# Patient Record
Sex: FEMALE | Race: WHITE | NOT HISPANIC OR LATINO | Employment: OTHER | URBAN - NONMETROPOLITAN AREA
[De-identification: names, ages, dates, MRNs, and addresses within clinical notes are randomized per-mention and may not be internally consistent; named-entity substitution may affect disease eponyms.]

---

## 2023-02-28 PROBLEM — R74.8 ELEVATED ALKALINE PHOSPHATASE MEASUREMENT: Status: ACTIVE | Noted: 2023-02-28

## 2023-02-28 PROBLEM — R06.02 MILD SHORTNESS OF BREATH: Status: ACTIVE | Noted: 2023-02-28

## 2023-02-28 PROBLEM — M25.571 ACUTE RIGHT ANKLE PAIN: Status: ACTIVE | Noted: 2023-02-28

## 2023-02-28 PROBLEM — S89.91XA RIGHT KNEE INJURY: Status: ACTIVE | Noted: 2023-02-28

## 2023-02-28 PROBLEM — F32.A DEPRESSION: Status: ACTIVE | Noted: 2023-02-28

## 2023-02-28 PROBLEM — S86.819A STRAIN OF CALF MUSCLE: Status: ACTIVE | Noted: 2023-02-28

## 2023-02-28 PROBLEM — R00.2 PALPITATIONS: Status: ACTIVE | Noted: 2023-02-28

## 2023-02-28 PROBLEM — E66.01 CLASS 3 SEVERE OBESITY DUE TO EXCESS CALORIES WITH BODY MASS INDEX (BMI) OF 50.0 TO 59.9 IN ADULT (MULTI): Status: ACTIVE | Noted: 2023-02-28

## 2023-02-28 PROBLEM — Q21.12 PFO (PATENT FORAMEN OVALE) (HHS-HCC): Status: ACTIVE | Noted: 2023-02-28

## 2023-02-28 PROBLEM — E55.9 VITAMIN D DEFICIENCY: Status: ACTIVE | Noted: 2023-02-28

## 2023-02-28 PROBLEM — E66.813 CLASS 3 SEVERE OBESITY DUE TO EXCESS CALORIES WITH BODY MASS INDEX (BMI) OF 50.0 TO 59.9 IN ADULT: Status: ACTIVE | Noted: 2023-02-28

## 2023-02-28 PROBLEM — F41.9 ANXIETY: Status: ACTIVE | Noted: 2023-02-28

## 2023-02-28 PROBLEM — R79.89 ABNORMAL CBC MEASUREMENT: Status: ACTIVE | Noted: 2023-02-28

## 2023-02-28 LAB
ALANINE AMINOTRANSFERASE (SGPT) (U/L) IN SER/PLAS: 28 U/L (ref 7–45)
ALBUMIN (G/DL) IN SER/PLAS: 4.2 G/DL (ref 3.4–5)
ALKALINE PHOSPHATASE (U/L) IN SER/PLAS: 111 U/L (ref 33–110)
ASPARTATE AMINOTRANSFERASE (SGOT) (U/L) IN SER/PLAS: 17 U/L (ref 9–39)
BASOPHILS (10*3/UL) IN BLOOD BY AUTOMATED COUNT: 0.04 X10E9/L (ref 0–0.1)
BASOPHILS/100 LEUKOCYTES IN BLOOD BY AUTOMATED COUNT: 0.4 % (ref 0–2)
BILIRUBIN DIRECT (MG/DL) IN SER/PLAS: 0 MG/DL (ref 0–0.3)
BILIRUBIN TOTAL (MG/DL) IN SER/PLAS: 0.3 MG/DL (ref 0–1.2)
EOSINOPHILS (10*3/UL) IN BLOOD BY AUTOMATED COUNT: 0.37 X10E9/L (ref 0–0.7)
EOSINOPHILS/100 LEUKOCYTES IN BLOOD BY AUTOMATED COUNT: 3.3 % (ref 0–6)
ERYTHROCYTE DISTRIBUTION WIDTH (RATIO) BY AUTOMATED COUNT: 13.3 % (ref 11.5–14.5)
ERYTHROCYTE MEAN CORPUSCULAR HEMOGLOBIN CONCENTRATION (G/DL) BY AUTOMATED: 31.5 G/DL (ref 32–36)
ERYTHROCYTE MEAN CORPUSCULAR VOLUME (FL) BY AUTOMATED COUNT: 84 FL (ref 80–100)
ERYTHROCYTES (10*6/UL) IN BLOOD BY AUTOMATED COUNT: 4.75 X10E12/L (ref 4–5.2)
FERRITIN (UG/LL) IN SER/PLAS: 87 UG/L (ref 8–150)
GAMMA GLUTAMYL TRANSFERASE (U/L) IN SER/PLAS: 19 U/L (ref 5–55)
HEMATOCRIT (%) IN BLOOD BY AUTOMATED COUNT: 39.7 % (ref 36–46)
HEMOGLOBIN (G/DL) IN BLOOD: 12.5 G/DL (ref 12–16)
IMMATURE GRANULOCYTES/100 LEUKOCYTES IN BLOOD BY AUTOMATED COUNT: 0.3 % (ref 0–0.9)
LEUKOCYTES (10*3/UL) IN BLOOD BY AUTOMATED COUNT: 11.2 X10E9/L (ref 4.4–11.3)
LYMPHOCYTES (10*3/UL) IN BLOOD BY AUTOMATED COUNT: 3.08 X10E9/L (ref 1.2–4.8)
LYMPHOCYTES/100 LEUKOCYTES IN BLOOD BY AUTOMATED COUNT: 27.5 % (ref 13–44)
MONOCYTES (10*3/UL) IN BLOOD BY AUTOMATED COUNT: 0.44 X10E9/L (ref 0.1–1)
MONOCYTES/100 LEUKOCYTES IN BLOOD BY AUTOMATED COUNT: 3.9 % (ref 2–10)
NEUTROPHILS (10*3/UL) IN BLOOD BY AUTOMATED COUNT: 7.26 X10E9/L (ref 1.2–7.7)
NEUTROPHILS/100 LEUKOCYTES IN BLOOD BY AUTOMATED COUNT: 64.6 % (ref 40–80)
PLATELETS (10*3/UL) IN BLOOD AUTOMATED COUNT: 481 X10E9/L (ref 150–450)
PROTEIN TOTAL: 8 G/DL (ref 6.4–8.2)

## 2023-02-28 RX ORDER — UBROGEPANT 50 MG/1
TABLET ORAL
COMMUNITY
Start: 2022-03-08 | End: 2023-06-13 | Stop reason: ALTCHOICE

## 2023-02-28 RX ORDER — ESCITALOPRAM OXALATE 5 MG/1
1 TABLET ORAL DAILY
COMMUNITY
End: 2023-03-14 | Stop reason: SDUPTHER

## 2023-02-28 RX ORDER — ERENUMAB-AOOE 70 MG/ML
INJECTION SUBCUTANEOUS
COMMUNITY
Start: 2022-05-05

## 2023-02-28 RX ORDER — LACOSAMIDE 100 MG/1
1 TABLET ORAL 2 TIMES DAILY
COMMUNITY
Start: 2022-02-02

## 2023-02-28 RX ORDER — LEVETIRACETAM 1000 MG/1
TABLET ORAL
COMMUNITY
Start: 2021-06-28 | End: 2023-06-13 | Stop reason: ALTCHOICE

## 2023-03-14 ENCOUNTER — OFFICE VISIT (OUTPATIENT)
Dept: PRIMARY CARE | Facility: CLINIC | Age: 28
End: 2023-03-14
Payer: MEDICAID

## 2023-03-14 VITALS
DIASTOLIC BLOOD PRESSURE: 78 MMHG | BODY MASS INDEX: 50.02 KG/M2 | WEIGHT: 293 LBS | HEIGHT: 64 IN | SYSTOLIC BLOOD PRESSURE: 120 MMHG | OXYGEN SATURATION: 98 % | HEART RATE: 78 BPM

## 2023-03-14 DIAGNOSIS — F41.9 ANXIETY: Primary | ICD-10-CM

## 2023-03-14 DIAGNOSIS — E66.01 CLASS 3 SEVERE OBESITY DUE TO EXCESS CALORIES WITHOUT SERIOUS COMORBIDITY WITH BODY MASS INDEX (BMI) OF 50.0 TO 59.9 IN ADULT (MULTI): ICD-10-CM

## 2023-03-14 PROBLEM — G43.709 CHRONIC MIGRAINE WITHOUT AURA: Status: ACTIVE | Noted: 2021-04-14

## 2023-03-14 PROBLEM — G40.909 EPILEPSY, UNSPECIFIED, NOT INTRACTABLE, WITHOUT STATUS EPILEPTICUS (MULTI): Status: ACTIVE | Noted: 2017-10-04

## 2023-03-14 PROCEDURE — 99214 OFFICE O/P EST MOD 30 MIN: CPT | Performed by: NURSE PRACTITIONER

## 2023-03-14 PROCEDURE — 1036F TOBACCO NON-USER: CPT | Performed by: NURSE PRACTITIONER

## 2023-03-14 PROCEDURE — 3008F BODY MASS INDEX DOCD: CPT | Performed by: NURSE PRACTITIONER

## 2023-03-14 RX ORDER — ESCITALOPRAM OXALATE 10 MG/1
10 TABLET ORAL DAILY
Qty: 30 TABLET | Refills: 0 | Status: SHIPPED | OUTPATIENT
Start: 2023-03-14 | End: 2023-04-24

## 2023-03-14 RX ORDER — ERGOCALCIFEROL 1.25 MG/1
1 CAPSULE ORAL WEEKLY
COMMUNITY
Start: 2023-02-01 | End: 2023-08-08 | Stop reason: SDUPTHER

## 2023-03-14 ASSESSMENT — PATIENT HEALTH QUESTIONNAIRE - PHQ9
1. LITTLE INTEREST OR PLEASURE IN DOING THINGS: NEARLY EVERY DAY
SUM OF ALL RESPONSES TO PHQ9 QUESTIONS 1 AND 2: 6
2. FEELING DOWN, DEPRESSED OR HOPELESS: NEARLY EVERY DAY

## 2023-03-14 ASSESSMENT — ENCOUNTER SYMPTOMS
FATIGUE: 0
NAUSEA: 0
DIARRHEA: 0
ABDOMINAL PAIN: 0
PALPITATIONS: 0
VOMITING: 0
MYALGIAS: 0
COLOR CHANGE: 0
SHORTNESS OF BREATH: 0
DYSURIA: 0
LIGHT-HEADEDNESS: 0
DIZZINESS: 0
ARTHRALGIAS: 0
CONSTIPATION: 0
BLOOD IN STOOL: 0
NERVOUS/ANXIOUS: 0
CHEST TIGHTNESS: 0
PSYCHIATRIC NEGATIVE: 1
HEADACHES: 0

## 2023-03-14 NOTE — PROGRESS NOTES
"Subjective   Patient ID: Geri Moss is a 28 y.o. female who presents for Medication follow up.    HPI     Review of Systems    Objective   /78 (BP Location: Right arm)   Pulse 78   Ht 1.626 m (5' 4\")   Wt (!) 142 kg (314 lb)   SpO2 98%   BMI 53.90 kg/m²     Physical Exam    Assessment/Plan          "

## 2023-03-14 NOTE — PATIENT INSTRUCTIONS
BMI was above normal measurement. Current weight: (!) 142 kg (314 lb)  Weight change since last visit (-) denotes wt loss 314 lbs   Weight loss needed to achieve BMI 25: 168.7 Lbs  Weight loss needed to achieve BMI 30: 139.6 Lbs  Provided instructions on dietary changes  Provided instructions on exercise  Advised to Increase physical activity

## 2023-03-14 NOTE — PROGRESS NOTES
"Subjective   Patient ID: Geri Moss is a 28 y.o. female who presents for Medication follow up.    HPI   Geri is doing well on the Escitalopram.    Anxiety: she reports that her anxious cough is better but is still there. She is tolerating medication well. She would like to increase medication because she is still feeling a little anxious.     Increased weight: She would like to get consult to nutrition to discuss meal planning due to her weight.       Review of Systems   Constitutional:  Negative for fatigue.   Respiratory:  Negative for chest tightness and shortness of breath.    Cardiovascular:  Negative for chest pain, palpitations and leg swelling.   Gastrointestinal:  Negative for abdominal pain, blood in stool, constipation, diarrhea, nausea and vomiting.   Genitourinary:  Negative for dysuria.   Musculoskeletal:  Negative for arthralgias and myalgias.   Skin:  Negative for color change.   Neurological:  Negative for dizziness, light-headedness and headaches.   Psychiatric/Behavioral: Negative.  Negative for suicidal ideas. The patient is not nervous/anxious.        Objective   /78 (BP Location: Right arm)   Pulse 78   Ht 1.626 m (5' 4\")   Wt (!) 142 kg (314 lb)   SpO2 98%   BMI 53.90 kg/m²     Physical Exam  Vitals and nursing note reviewed.   Constitutional:       Appearance: Normal appearance.   HENT:      Head: Normocephalic.   Cardiovascular:      Rate and Rhythm: Normal rate and regular rhythm.      Heart sounds: Normal heart sounds.   Pulmonary:      Effort: Pulmonary effort is normal.      Breath sounds: Normal breath sounds.   Neurological:      Mental Status: She is alert and oriented to person, place, and time.   Psychiatric:         Mood and Affect: Mood normal.         Behavior: Behavior normal.         Thought Content: Thought content normal.         Judgment: Judgment normal.         Assessment/Plan   Diagnoses and all orders for this visit:  Anxiety  -     escitalopram " (Lexapro) 10 mg tablet; Take 1 tablet (10 mg) by mouth once daily.  Class 3 severe obesity due to excess calories without serious comorbidity with body mass index (BMI) of 50.0 to 59.9 in adult (CMS/[facility])  -     Referral to Nutrition Services; Future      She will let us know in 1 month how she is doing and if she needs to stay on the Escitalopram 10 mg or if she needs an increase to 20 mg. Otherwise follow up in 3 months for medication check.

## 2023-03-28 LAB
ACTIVATED PARTIAL THROMBOPLASTIN TIME IN PPP BY COAGULATION ASSAY: 38 SEC (ref 26–39)
ALANINE AMINOTRANSFERASE (SGPT) (U/L) IN SER/PLAS: 23 U/L (ref 7–45)
ALBUMIN (G/DL) IN SER/PLAS: 4.1 G/DL (ref 3.4–5)
ALKALINE PHOSPHATASE (U/L) IN SER/PLAS: 116 U/L (ref 33–110)
ANION GAP IN SER/PLAS: 10 MMOL/L (ref 10–20)
ASPARTATE AMINOTRANSFERASE (SGOT) (U/L) IN SER/PLAS: 16 U/L (ref 9–39)
BASOPHILS (10*3/UL) IN BLOOD BY AUTOMATED COUNT: 0.04 X10E9/L (ref 0–0.1)
BASOPHILS/100 LEUKOCYTES IN BLOOD BY AUTOMATED COUNT: 0.4 % (ref 0–2)
BILIRUBIN TOTAL (MG/DL) IN SER/PLAS: 0.3 MG/DL (ref 0–1.2)
CALCIUM (MG/DL) IN SER/PLAS: 9.2 MG/DL (ref 8.6–10.3)
CARBON DIOXIDE, TOTAL (MMOL/L) IN SER/PLAS: 28 MMOL/L (ref 21–32)
CHLORIDE (MMOL/L) IN SER/PLAS: 103 MMOL/L (ref 98–107)
CREATININE (MG/DL) IN SER/PLAS: 0.65 MG/DL (ref 0.5–1.05)
EOSINOPHILS (10*3/UL) IN BLOOD BY AUTOMATED COUNT: 0.21 X10E9/L (ref 0–0.7)
EOSINOPHILS/100 LEUKOCYTES IN BLOOD BY AUTOMATED COUNT: 1.9 % (ref 0–6)
ERYTHROCYTE DISTRIBUTION WIDTH (RATIO) BY AUTOMATED COUNT: 13.3 % (ref 11.5–14.5)
ERYTHROCYTE MEAN CORPUSCULAR HEMOGLOBIN CONCENTRATION (G/DL) BY AUTOMATED: 31.8 G/DL (ref 32–36)
ERYTHROCYTE MEAN CORPUSCULAR VOLUME (FL) BY AUTOMATED COUNT: 83 FL (ref 80–100)
ERYTHROCYTES (10*6/UL) IN BLOOD BY AUTOMATED COUNT: 4.59 X10E12/L (ref 4–5.2)
GFR FEMALE: >90 ML/MIN/1.73M2
GLUCOSE (MG/DL) IN SER/PLAS: 99 MG/DL (ref 74–99)
HEMATOCRIT (%) IN BLOOD BY AUTOMATED COUNT: 38 % (ref 36–46)
HEMOGLOBIN (G/DL) IN BLOOD: 12.1 G/DL (ref 12–16)
IMMATURE GRANULOCYTES/100 LEUKOCYTES IN BLOOD BY AUTOMATED COUNT: 0.3 % (ref 0–0.9)
INR IN PPP BY COAGULATION ASSAY: 1.1 (ref 0.9–1.1)
LEUKOCYTES (10*3/UL) IN BLOOD BY AUTOMATED COUNT: 10.8 X10E9/L (ref 4.4–11.3)
LYMPHOCYTES (10*3/UL) IN BLOOD BY AUTOMATED COUNT: 3.3 X10E9/L (ref 1.2–4.8)
LYMPHOCYTES/100 LEUKOCYTES IN BLOOD BY AUTOMATED COUNT: 30.6 % (ref 13–44)
MONOCYTES (10*3/UL) IN BLOOD BY AUTOMATED COUNT: 0.45 X10E9/L (ref 0.1–1)
MONOCYTES/100 LEUKOCYTES IN BLOOD BY AUTOMATED COUNT: 4.2 % (ref 2–10)
NEUTROPHILS (10*3/UL) IN BLOOD BY AUTOMATED COUNT: 6.76 X10E9/L (ref 1.2–7.7)
NEUTROPHILS/100 LEUKOCYTES IN BLOOD BY AUTOMATED COUNT: 62.6 % (ref 40–80)
PLATELETS (10*3/UL) IN BLOOD AUTOMATED COUNT: 417 X10E9/L (ref 150–450)
POTASSIUM (MMOL/L) IN SER/PLAS: 4 MMOL/L (ref 3.5–5.3)
PROTEIN TOTAL: 7.6 G/DL (ref 6.4–8.2)
PROTHROMBIN TIME (PT) IN PPP BY COAGULATION ASSAY: 13 SEC (ref 9.8–13.4)
SODIUM (MMOL/L) IN SER/PLAS: 137 MMOL/L (ref 136–145)
UREA NITROGEN (MG/DL) IN SER/PLAS: 14 MG/DL (ref 6–23)

## 2023-04-03 VITALS — BODY MASS INDEX: 50.02 KG/M2 | WEIGHT: 293 LBS | HEIGHT: 64 IN

## 2023-04-03 RX ORDER — UBROGEPANT 100 MG/1
TABLET ORAL PRN
COMMUNITY

## 2023-04-03 RX ORDER — LEVETIRACETAM 500 MG/1
1500 TABLET ORAL 2 TIMES DAILY
COMMUNITY

## 2023-04-03 RX ORDER — ESCITALOPRAM OXALATE 10 MG/1
10 TABLET ORAL DAILY
COMMUNITY

## 2023-04-03 RX ORDER — ERENUMAB-AOOE 70 MG/ML
INJECTION SUBCUTANEOUS
COMMUNITY

## 2023-04-03 RX ORDER — ERGOCALCIFEROL 1.25 MG/1
50000 CAPSULE ORAL WEEKLY
COMMUNITY

## 2023-04-03 RX ORDER — LACOSAMIDE 100 MG/1
100 TABLET ORAL 2 TIMES DAILY
COMMUNITY

## 2023-04-04 ENCOUNTER — HOSPITAL ENCOUNTER (OUTPATIENT)
Dept: PREADMISSION TESTING | Age: 28
Discharge: HOME | End: 2023-04-04

## 2023-04-23 DIAGNOSIS — F41.9 ANXIETY: ICD-10-CM

## 2023-04-24 RX ORDER — ESCITALOPRAM OXALATE 20 MG/1
20 TABLET ORAL DAILY
Qty: 30 TABLET | Refills: 0 | Status: SHIPPED | OUTPATIENT
Start: 2023-04-24 | End: 2023-05-30

## 2023-05-26 DIAGNOSIS — F41.9 ANXIETY: ICD-10-CM

## 2023-05-30 RX ORDER — ESCITALOPRAM OXALATE 20 MG/1
TABLET ORAL
Qty: 30 TABLET | Refills: 0 | Status: SHIPPED | OUTPATIENT
Start: 2023-05-30 | End: 2023-07-10 | Stop reason: SDUPTHER

## 2023-06-13 ENCOUNTER — OFFICE VISIT (OUTPATIENT)
Dept: PRIMARY CARE | Facility: CLINIC | Age: 28
End: 2023-06-13
Payer: MEDICAID

## 2023-06-13 VITALS
BODY MASS INDEX: 50.02 KG/M2 | OXYGEN SATURATION: 98 % | HEIGHT: 64 IN | SYSTOLIC BLOOD PRESSURE: 120 MMHG | HEART RATE: 75 BPM | DIASTOLIC BLOOD PRESSURE: 82 MMHG | WEIGHT: 293 LBS

## 2023-06-13 DIAGNOSIS — E55.9 VITAMIN D DEFICIENCY: ICD-10-CM

## 2023-06-13 DIAGNOSIS — R73.03 PREDIABETES: ICD-10-CM

## 2023-06-13 DIAGNOSIS — F34.1 PERSISTENT DEPRESSIVE DISORDER: ICD-10-CM

## 2023-06-13 DIAGNOSIS — R74.8 ELEVATED ALKALINE PHOSPHATASE MEASUREMENT: ICD-10-CM

## 2023-06-13 DIAGNOSIS — E66.01 CLASS 3 SEVERE OBESITY DUE TO EXCESS CALORIES WITHOUT SERIOUS COMORBIDITY WITH BODY MASS INDEX (BMI) OF 50.0 TO 59.9 IN ADULT (MULTI): ICD-10-CM

## 2023-06-13 DIAGNOSIS — F41.9 ANXIETY: Primary | ICD-10-CM

## 2023-06-13 PROBLEM — G40.909 SEIZURE DISORDER (MULTI): Status: ACTIVE | Noted: 2017-10-04

## 2023-06-13 PROBLEM — S89.91XA RIGHT KNEE INJURY: Status: RESOLVED | Noted: 2023-02-28 | Resolved: 2023-06-13

## 2023-06-13 PROBLEM — M25.571 ACUTE RIGHT ANKLE PAIN: Status: RESOLVED | Noted: 2023-02-28 | Resolved: 2023-06-13

## 2023-06-13 PROBLEM — G43.709 CHRONIC MIGRAINE WITHOUT AURA WITHOUT STATUS MIGRAINOSUS, NOT INTRACTABLE: Status: ACTIVE | Noted: 2019-01-22

## 2023-06-13 PROCEDURE — 99214 OFFICE O/P EST MOD 30 MIN: CPT | Performed by: NURSE PRACTITIONER

## 2023-06-13 PROCEDURE — 3008F BODY MASS INDEX DOCD: CPT | Performed by: NURSE PRACTITIONER

## 2023-06-13 PROCEDURE — 1036F TOBACCO NON-USER: CPT | Performed by: NURSE PRACTITIONER

## 2023-06-13 RX ORDER — BUSPIRONE HYDROCHLORIDE 5 MG/1
5 TABLET ORAL 3 TIMES DAILY PRN
Qty: 90 TABLET | Refills: 0 | Status: SHIPPED | OUTPATIENT
Start: 2023-06-13

## 2023-06-13 RX ORDER — LEVETIRACETAM 500 MG/1
1500 TABLET ORAL 2 TIMES DAILY
COMMUNITY

## 2023-06-13 ASSESSMENT — ENCOUNTER SYMPTOMS
PALPITATIONS: 0
CHEST TIGHTNESS: 0
BLOOD IN STOOL: 0
SHORTNESS OF BREATH: 0
NAUSEA: 0
VOMITING: 0
DIZZINESS: 0
DYSURIA: 0
COLOR CHANGE: 0
ABDOMINAL PAIN: 0
MYALGIAS: 0
LIGHT-HEADEDNESS: 0
DIARRHEA: 0
HEADACHES: 0
ARTHRALGIAS: 1
FATIGUE: 0
CONSTIPATION: 0

## 2023-06-13 ASSESSMENT — PATIENT HEALTH QUESTIONNAIRE - PHQ9
1. LITTLE INTEREST OR PLEASURE IN DOING THINGS: NOT AT ALL
SUM OF ALL RESPONSES TO PHQ9 QUESTIONS 1 AND 2: 0
2. FEELING DOWN, DEPRESSED OR HOPELESS: NOT AT ALL

## 2023-06-13 NOTE — PROGRESS NOTES
"Subjective   Patient ID: Geri Moss is a 28 y.o. female who presents for 3 mo medication follow up; doing well on Lexapro, but feels it may need another little boost.      HPI  Geri returns for follow up on anxiety medication.    Anxiety:  She is currently on lexapro 20 mg daily. Feels her depression is sometimes worse and then she will get panicky. She feels like the lexapro helps with her anxiety and helped with her nervous cough.     Due for lab work at end of /beginning of July.       Review of Systems   Constitutional:  Negative for fatigue.   Respiratory:  Negative for chest tightness and shortness of breath.    Cardiovascular:  Negative for chest pain, palpitations and leg swelling.   Gastrointestinal:  Negative for abdominal pain, blood in stool, constipation, diarrhea, nausea and vomiting.   Genitourinary:  Negative for dysuria.   Musculoskeletal:  Positive for arthralgias (right knee pain/intermittently). Negative for myalgias.   Skin:  Negative for color change.   Neurological:  Negative for dizziness, light-headedness and headaches.       Objective   Vitals:    23 1009   BP: 120/82   BP Location: Left arm   Pulse: 75   SpO2: 98%   Weight: 143 kg (315 lb)   Height: 1.626 m (5' 4\")      Physical Exam  Vitals and nursing note reviewed.   Constitutional:       Appearance: Normal appearance.   Cardiovascular:      Rate and Rhythm: Normal rate and regular rhythm.      Heart sounds: Normal heart sounds.   Pulmonary:      Effort: Pulmonary effort is normal.      Breath sounds: Normal breath sounds.   Musculoskeletal:      Right lower le+ Edema present.   Skin:     General: Skin is warm and dry.   Neurological:      Mental Status: She is alert and oriented to person, place, and time.   Psychiatric:         Mood and Affect: Mood normal.         Behavior: Behavior normal.         Thought Content: Thought content normal.         Judgment: Judgment normal.         Assessment/Plan "   Diagnoses and all orders for this visit:  Anxiety  -     busPIRone (Buspar) 5 mg tablet; Take 1 tablet (5 mg) by mouth 3 times a day as needed (anxiety).  Persistent depressive disorder  Vitamin D deficiency  -     Vitamin D, Total; Future  Elevated alkaline phosphatase measurement  -     Hepatic function panel; Future  Prediabetes  -     Hemoglobin A1C; Future  Class 3 severe obesity due to excess calories without serious comorbidity with body mass index (BMI) of 50.0 to 59.9 in adult (CMS/[facility])      Problem List Items Addressed This Visit       Vitamin D deficiency    Relevant Orders    Vitamin D, Total    Elevated alkaline phosphatase measurement    Relevant Orders    Hepatic function panel    Depression     Currently on Lexapro 20 mg daily.         Anxiety - Primary     Currently on lexapro 20 mg daily.          Relevant Medications    busPIRone (Buspar) 5 mg tablet    Class 3 severe obesity due to excess calories with body mass index (BMI) of 50.0 to 59.9 in adult (CMS/[facility])     Pt advised to institute a healthy, well-balanced meal with portion control and to exercise daily for at least 30-60 minutes.          Other Visit Diagnoses       Prediabetes        Relevant Orders    Hemoglobin A1C            Follow up in August as scheduled. She will get labs end of June early July. We will call with results and then follow up as scheduled.

## 2023-06-13 NOTE — PATIENT INSTRUCTIONS
BMI was above normal measurement. Current weight: 143 kg (315 lb)  Weight change since last visit (-) denotes wt loss 1 lbs   Weight loss needed to achieve BMI 25: 169.7 Lbs  Weight loss needed to achieve BMI 30: 140.6 Lbs  Provided instructions on dietary changes  Provided instructions on exercise  Advised to Increase physical activity

## 2023-07-07 ENCOUNTER — LAB (OUTPATIENT)
Dept: LAB | Facility: LAB | Age: 28
End: 2023-07-07
Payer: MEDICAID

## 2023-07-07 DIAGNOSIS — R73.03 PREDIABETES: ICD-10-CM

## 2023-07-07 DIAGNOSIS — E55.9 VITAMIN D DEFICIENCY: ICD-10-CM

## 2023-07-07 DIAGNOSIS — R74.8 ELEVATED ALKALINE PHOSPHATASE MEASUREMENT: ICD-10-CM

## 2023-07-07 LAB
ALANINE AMINOTRANSFERASE (SGPT) (U/L) IN SER/PLAS: 24 U/L (ref 7–45)
ALBUMIN (G/DL) IN SER/PLAS: 3.9 G/DL (ref 3.4–5)
ALKALINE PHOSPHATASE (U/L) IN SER/PLAS: 112 U/L (ref 33–110)
ASPARTATE AMINOTRANSFERASE (SGOT) (U/L) IN SER/PLAS: 16 U/L (ref 9–39)
BILIRUBIN DIRECT (MG/DL) IN SER/PLAS: 0 MG/DL (ref 0–0.3)
BILIRUBIN TOTAL (MG/DL) IN SER/PLAS: 0.3 MG/DL (ref 0–1.2)
CALCIDIOL (25 OH VITAMIN D3) (NG/ML) IN SER/PLAS: 13 NG/ML
ESTIMATED AVERAGE GLUCOSE FOR HBA1C: 120 MG/DL
HEMOGLOBIN A1C/HEMOGLOBIN TOTAL IN BLOOD: 5.8 %
PROTEIN TOTAL: 6.8 G/DL (ref 6.4–8.2)

## 2023-07-07 PROCEDURE — 80076 HEPATIC FUNCTION PANEL: CPT

## 2023-07-07 PROCEDURE — 83036 HEMOGLOBIN GLYCOSYLATED A1C: CPT

## 2023-07-07 PROCEDURE — 82306 VITAMIN D 25 HYDROXY: CPT

## 2023-07-07 PROCEDURE — 36415 COLL VENOUS BLD VENIPUNCTURE: CPT

## 2023-07-10 DIAGNOSIS — F41.9 ANXIETY: ICD-10-CM

## 2023-07-10 RX ORDER — ESCITALOPRAM OXALATE 20 MG/1
20 TABLET ORAL DAILY
Qty: 90 TABLET | Refills: 1 | Status: SHIPPED | OUTPATIENT
Start: 2023-07-10 | End: 2024-01-23

## 2023-07-12 ENCOUNTER — TELEPHONE (OUTPATIENT)
Dept: PRIMARY CARE | Facility: CLINIC | Age: 28
End: 2023-07-12

## 2023-07-12 DIAGNOSIS — R74.8 ELEVATED ALKALINE PHOSPHATASE MEASUREMENT: Primary | ICD-10-CM

## 2023-07-12 NOTE — TELEPHONE ENCOUNTER
----- Message from KAREEM Barry-CNP sent at 7/10/2023  3:24 PM EDT -----  Let her know her A1c is elevated at 5.8% still in prediabetic range. Her vitamin D is still low at 13. We will continue with the high dose vitamin D weekly. Her alkaline phosphatase is still elevated, however better than last time. This is more than likely d/t her fatty liver seen on her CT done last year. However, I would like her to do an US of her liver if she is willing?

## 2023-07-12 NOTE — TELEPHONE ENCOUNTER
Left pt a message informing her that Shabnam's comments regarding lab results was posted to her [employer].  Requested a call back, or a reply through the portal.

## 2023-07-22 DIAGNOSIS — E55.9 VITAMIN D DEFICIENCY: Primary | ICD-10-CM

## 2023-07-22 DIAGNOSIS — R73.03 PREDIABETES: ICD-10-CM

## 2023-07-22 DIAGNOSIS — R74.8 ELEVATED ALKALINE PHOSPHATASE MEASUREMENT: ICD-10-CM

## 2023-07-22 NOTE — RESULT ENCOUNTER NOTE
Let her know on her US of her liver, it showed she has some non-obstructing gallstones, non-obstructing kidney stones that are small, and that her liver is enlarged on the right side and they do not mention any appearance of a fatty liver on the US. However, an enlarged liver can be from a fatty liver cause. We will monitor her liver enzymes, and if they worsen we will send to liver specialist. Next labs in 6 months. (january

## 2023-08-01 ENCOUNTER — APPOINTMENT (OUTPATIENT)
Dept: PRIMARY CARE | Facility: CLINIC | Age: 28
End: 2023-08-01

## 2023-08-01 ENCOUNTER — TELEPHONE (OUTPATIENT)
Dept: PRIMARY CARE | Facility: CLINIC | Age: 28
End: 2023-08-01

## 2023-08-01 NOTE — TELEPHONE ENCOUNTER
----- Message from KAREEM Barry-CNP sent at 7/22/2023 12:31 PM EDT -----  Let her know on her US of her liver, it showed she has some non-obstructing gallstones, non-obstructing kidney stones that are small, and that her liver is enlarged on the right side and they do not mention any appearance of a fatty liver on the US. However, an enlarged liver can be from a fatty liver cause. We will monitor her liver enzymes, and if they worsen we will send to liver specialist. Next labs in 6 months. (january

## 2023-08-01 NOTE — TELEPHONE ENCOUNTER
The following message was sent via [employer] portal.  Requested a reply or phone call back if she has any questions or concerns.

## 2023-08-02 ENCOUNTER — APPOINTMENT (OUTPATIENT)
Dept: PRIMARY CARE | Facility: CLINIC | Age: 28
End: 2023-08-02

## 2023-08-08 ENCOUNTER — OFFICE VISIT (OUTPATIENT)
Dept: PRIMARY CARE | Facility: CLINIC | Age: 28
End: 2023-08-08
Payer: MEDICARE

## 2023-08-08 VITALS
BODY MASS INDEX: 50.02 KG/M2 | DIASTOLIC BLOOD PRESSURE: 80 MMHG | HEIGHT: 64 IN | WEIGHT: 293 LBS | SYSTOLIC BLOOD PRESSURE: 128 MMHG | OXYGEN SATURATION: 97 % | HEART RATE: 97 BPM

## 2023-08-08 DIAGNOSIS — E55.9 VITAMIN D DEFICIENCY: Primary | ICD-10-CM

## 2023-08-08 DIAGNOSIS — R74.8 ELEVATED ALKALINE PHOSPHATASE MEASUREMENT: ICD-10-CM

## 2023-08-08 DIAGNOSIS — R79.89 ABNORMAL CBC MEASUREMENT: ICD-10-CM

## 2023-08-08 DIAGNOSIS — E66.01 CLASS 3 SEVERE OBESITY DUE TO EXCESS CALORIES WITH SERIOUS COMORBIDITY AND BODY MASS INDEX (BMI) OF 50.0 TO 59.9 IN ADULT (MULTI): ICD-10-CM

## 2023-08-08 DIAGNOSIS — R73.03 PREDIABETES: ICD-10-CM

## 2023-08-08 PROCEDURE — 3008F BODY MASS INDEX DOCD: CPT | Performed by: NURSE PRACTITIONER

## 2023-08-08 PROCEDURE — 1036F TOBACCO NON-USER: CPT | Performed by: NURSE PRACTITIONER

## 2023-08-08 PROCEDURE — 99214 OFFICE O/P EST MOD 30 MIN: CPT | Performed by: NURSE PRACTITIONER

## 2023-08-08 RX ORDER — LEVETIRACETAM 1000 MG/1
TABLET ORAL
COMMUNITY
Start: 2023-07-18

## 2023-08-08 RX ORDER — SEMAGLUTIDE 0.25 MG/.5ML
0.25 INJECTION, SOLUTION SUBCUTANEOUS
Qty: 2 ML | Refills: 0 | Status: SHIPPED | OUTPATIENT
Start: 2023-08-08 | End: 2024-01-30

## 2023-08-08 RX ORDER — ERGOCALCIFEROL 1.25 MG/1
1 CAPSULE ORAL
Qty: 12 CAPSULE | Refills: 3 | Status: SHIPPED | OUTPATIENT
Start: 2023-08-08 | End: 2024-01-31 | Stop reason: ALTCHOICE

## 2023-08-08 ASSESSMENT — ENCOUNTER SYMPTOMS
NAUSEA: 0
ABDOMINAL PAIN: 0
DIARRHEA: 0
SHORTNESS OF BREATH: 0
CONSTIPATION: 0
VOMITING: 0
MYALGIAS: 0
DIZZINESS: 0
HEADACHES: 0
ARTHRALGIAS: 0
DYSURIA: 0
COLOR CHANGE: 0
BLOOD IN STOOL: 0
FATIGUE: 0
LIGHT-HEADEDNESS: 0
PALPITATIONS: 0
CHEST TIGHTNESS: 0

## 2023-08-08 NOTE — PROGRESS NOTES
"Subjective   Patient ID: Geri Moss is a 28 y.o. female who presents for lab review and follow up.    HPI   Geri returns for lab review and follow up.    Vitamin D def: Is still taking. Levels low at 13.     Prediabetes: A1c 5.8%, is trying to eat calorie deficit.     Weight loss: she would like to see about getting something for weight loss. Is working out at gym 3 days.       Review of Systems   Constitutional:  Negative for fatigue.   Respiratory:  Negative for chest tightness and shortness of breath.    Cardiovascular:  Negative for chest pain, palpitations and leg swelling.   Gastrointestinal:  Negative for abdominal pain, blood in stool, constipation, diarrhea, nausea and vomiting.   Genitourinary:  Negative for dysuria.   Musculoskeletal:  Negative for arthralgias and myalgias.   Skin:  Negative for color change.   Neurological:  Negative for dizziness, light-headedness and headaches.       Objective   /80   Pulse 97   Ht 1.626 m (5' 4\")   Wt 148 kg (326 lb 1 oz)   SpO2 97%   BMI 55.97 kg/m²     Physical Exam  Vitals and nursing note reviewed.   Constitutional:       Appearance: Normal appearance.   HENT:      Head: Normocephalic and atraumatic.   Cardiovascular:      Rate and Rhythm: Normal rate and regular rhythm.      Heart sounds: Normal heart sounds.   Pulmonary:      Effort: Pulmonary effort is normal.      Breath sounds: Normal breath sounds.   Abdominal:      General: Bowel sounds are normal.      Palpations: Abdomen is soft.   Musculoskeletal:      Right lower leg: Edema present.      Left lower leg: Edema present.   Neurological:      Mental Status: She is alert and oriented to person, place, and time.   Psychiatric:         Mood and Affect: Mood normal.         Behavior: Behavior normal.         Thought Content: Thought content normal.         Judgment: Judgment normal.         Assessment/Plan   Problem List Items Addressed This Visit       Vitamin D deficiency - Primary     " 7/7/23: vitamin D level 13         Relevant Medications    ergocalciferol (Vitamin D-2) 1.25 MG (91790 UT) capsule    Elevated alkaline phosphatase measurement     7/7/23: alk gardenia 112, was 116 3/28/23         Abnormal CBC measurement    Relevant Orders    Vitamin B12    CBC and Auto Differential    Class 3 severe obesity due to excess calories with serious comorbidity and body mass index (BMI) of 50.0 to 59.9 in adult (CMS/[facility])    Relevant Medications    semaglutide, weight loss, (Wegovy) 0.25 mg/0.5 mL pen injector    Prediabetes     7/7/23: HgbA1c 5.8%         Relevant Medications    semaglutide, weight loss, (Wegovy) 0.25 mg/0.5 mL pen injector         Follow up in January

## 2023-09-07 DIAGNOSIS — R73.03 PREDIABETES: ICD-10-CM

## 2023-09-07 DIAGNOSIS — E66.01 CLASS 3 SEVERE OBESITY DUE TO EXCESS CALORIES WITH SERIOUS COMORBIDITY AND BODY MASS INDEX (BMI) OF 50.0 TO 59.9 IN ADULT (MULTI): Primary | ICD-10-CM

## 2023-10-30 ENCOUNTER — APPOINTMENT (OUTPATIENT)
Dept: ORTHOPEDIC SURGERY | Facility: CLINIC | Age: 28
End: 2023-10-30
Payer: MEDICAID

## 2023-11-10 ENCOUNTER — APPOINTMENT (OUTPATIENT)
Dept: ORTHOPEDIC SURGERY | Facility: CLINIC | Age: 28
End: 2023-11-10
Payer: MEDICARE

## 2023-11-17 ENCOUNTER — OFFICE VISIT (OUTPATIENT)
Dept: ORTHOPEDIC SURGERY | Facility: CLINIC | Age: 28
End: 2023-11-17
Payer: MEDICARE

## 2023-11-17 ENCOUNTER — ANCILLARY PROCEDURE (OUTPATIENT)
Dept: RADIOLOGY | Facility: CLINIC | Age: 28
End: 2023-11-17
Payer: MEDICARE

## 2023-11-17 DIAGNOSIS — M79.671 FOOT PAIN, RIGHT: ICD-10-CM

## 2023-11-17 DIAGNOSIS — Z98.890 S/P ACL REPAIR: ICD-10-CM

## 2023-11-17 DIAGNOSIS — M79.671 FOOT PAIN, RIGHT: Primary | ICD-10-CM

## 2023-11-17 PROCEDURE — 73560 X-RAY EXAM OF KNEE 1 OR 2: CPT | Mod: RT,FY

## 2023-11-17 PROCEDURE — 73560 X-RAY EXAM OF KNEE 1 OR 2: CPT | Mod: RIGHT SIDE | Performed by: RADIOLOGY

## 2023-11-17 PROCEDURE — 1036F TOBACCO NON-USER: CPT | Performed by: ORTHOPAEDIC SURGERY

## 2023-11-17 PROCEDURE — 3008F BODY MASS INDEX DOCD: CPT | Performed by: ORTHOPAEDIC SURGERY

## 2023-11-17 PROCEDURE — 99214 OFFICE O/P EST MOD 30 MIN: CPT | Mod: PO,25 | Performed by: ORTHOPAEDIC SURGERY

## 2023-11-17 PROCEDURE — 99214 OFFICE O/P EST MOD 30 MIN: CPT | Performed by: ORTHOPAEDIC SURGERY

## 2023-11-17 PROCEDURE — 73630 X-RAY EXAM OF FOOT: CPT | Mod: RIGHT SIDE | Performed by: ORTHOPAEDIC SURGERY

## 2023-11-17 PROCEDURE — 73630 X-RAY EXAM OF FOOT: CPT | Mod: RT,FY

## 2023-11-17 NOTE — PROGRESS NOTES
History of Present Illness   No chief complaint on file.    Date of surgery: 3/28/2023.  Right knee ACL reconstruction with allograft  History of Present Illness   Patient presents after sustaining an injury to their knee.  They are complaining of ache, pain and discomfort.  Overall her knee is doing dramatically better.  Continues working with therapy.  She states knee feels almost 100%.  She has been have a new complaint of foot pain.  She states she injured her foot during her seizure injury.  States been really lagging and this is the only thing slowing her down from having a normal function in her leg.  Her foot drop has resolved  Imaging  Radiographs Knee: No acute fractures or dislocations.  No arthritic change and well-preserved joint space  X-ray right foot: Some mild widening in the first and second metatarsal interspace between 2 to 3 mm  Assessment:   Right knee ACL reconstruction  Right foot Lisfranc injury     Plan:  We reviewed the role of imaging, physical therapy, injections and the time frame to healing and correlation with outcome.  MRI right foot: Discussed with her she does look as she may have underlying a Lisfranc injury.  She may require definitive surgery for reconstruction options.  I have her referred to foot and ankle surgery.  [place] in [place] and [facility]  NSAID: Ibuprofen.  GI side effects and medical risks discussed  Ice: 30 minutes on and off  Exercise home program: Medically directed knee therapy / Handout given  She will return to see me back as needed     Physical Exam  Well-nourished, well-developed. No acute distress. Alert and oriented x3. Responds appropriately to questioning. Good mood. Normal affect.  Physical Exam  Right knee:  Skin healthy and intact  No gross swelling or ecchymosis  Trace to 1+ Effusion:   ROM: 110  Crepitance with range of motion  Trace effusion right foot: Patient is moderate pain in the base  of the first metatarsal.  Intact extensor and flexor lag deformity.  Her foot drop is improving.  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 Sig Documenting Provider Last Dose Status   busPIRone (Buspar) 5 mg tablet [id number] Yes Take 1 tablet (5 mg) by mouth 3 times a day as needed (anxiety). RODRIGUEZ Barry Taking Active   erenumab (Aimovig Autoinjector) 70 mg/mL injection [id number] Yes Inject under the skin. Historical Provider, MD Taking Active   ergocalciferol (Vitamin D-2) 1.25 MG (28098 UT) capsule [id number] Yes Take 1 capsule (1,250 mcg) by mouth once a week. Historical Provider, MD Taking Active   escitalopram (Lexapro) 20 mg tablet [id number] Yes Take 1 tablet (20 mg) by mouth once daily. RODRIGUEZ Barry Taking Active   lacosamide (Vimpat) 100 mg tablet [id number] Yes Take 1 tablet (100 mg) by mouth 2 times a day. Historical Provider, MD Taking Active   levETIRAcetam (Keppra) 1,000 mg tablet [id number] Yes TAKE 1 AND 1/2 TABLETS BY MOUTH 2 TIMES A DAY Historical Provider, MD  Active   levETIRAcetam (Keppra) 500 mg tablet [id number] Yes Take 3 tablets (1,500 mg) by mouth 2 times a day. Historical Provider, MD Taking Active   ubrogepant (Ubrelvy) 100 mg tablet tablet [id number]  Take by mouth. Historical Provider, MD  Active                    No Known Allergies    Social History     Socioeconomic History    Marital status: Single     Spouse name: Not on file    Number of children: Not on file    Years of  education: Not on file    Highest education level: Not on file   Occupational History    Not on file   Tobacco Use    Smoking status: Never    Smokeless tobacco: Never   Vaping Use    Vaping Use: Never used   Substance and Sexual Activity    Alcohol use: Never    Drug use: Never    Sexual activity: Not on file   Other Topics Concern    Not on file   Social History Narrative    Not on file     Social Determinants of Health     Financial Resource Strain: Not on file   Food Insecurity: Not on file   Transportation Needs: Not on file   Physical Activity: Not on file   Stress: Not on file   Social Connections: Not on file   Intimate Partner Violence: Not on file   Housing Stability: Not on file       Past Surgical History:   Procedure Laterality Date    ANTERIOR CRUCIATE LIGAMENT REPAIR Right        No results found.

## 2023-12-01 ENCOUNTER — HOSPITAL ENCOUNTER (OUTPATIENT)
Dept: RADIOLOGY | Facility: HOSPITAL | Age: 28
Discharge: HOME | End: 2023-12-01
Payer: MEDICARE

## 2023-12-01 DIAGNOSIS — M79.671 FOOT PAIN, RIGHT: ICD-10-CM

## 2023-12-01 PROCEDURE — 73718 MRI LOWER EXTREMITY W/O DYE: CPT | Mod: RT

## 2023-12-05 ENCOUNTER — OFFICE VISIT (OUTPATIENT)
Dept: ORTHOPEDIC SURGERY | Facility: HOSPITAL | Age: 28
End: 2023-12-05
Payer: MEDICARE

## 2023-12-05 ENCOUNTER — HOSPITAL ENCOUNTER (OUTPATIENT)
Dept: RADIOLOGY | Facility: HOSPITAL | Age: 28
Discharge: HOME | End: 2023-12-05
Payer: MEDICARE

## 2023-12-05 DIAGNOSIS — M79.672 BILATERAL FOOT PAIN: ICD-10-CM

## 2023-12-05 DIAGNOSIS — Z87.828 HISTORY OF FOOT SPRAIN: Primary | ICD-10-CM

## 2023-12-05 DIAGNOSIS — M79.671 BILATERAL FOOT PAIN: ICD-10-CM

## 2023-12-05 DIAGNOSIS — M79.671 FOOT PAIN, RIGHT: ICD-10-CM

## 2023-12-05 PROCEDURE — 73630 X-RAY EXAM OF FOOT: CPT | Mod: BILATERAL PROCEDURE | Performed by: RADIOLOGY

## 2023-12-05 PROCEDURE — 73630 X-RAY EXAM OF FOOT: CPT | Mod: 50,FY

## 2023-12-05 PROCEDURE — 1036F TOBACCO NON-USER: CPT | Performed by: STUDENT IN AN ORGANIZED HEALTH CARE EDUCATION/TRAINING PROGRAM

## 2023-12-05 PROCEDURE — 99213 OFFICE O/P EST LOW 20 MIN: CPT | Performed by: STUDENT IN AN ORGANIZED HEALTH CARE EDUCATION/TRAINING PROGRAM

## 2023-12-05 PROCEDURE — 99203 OFFICE O/P NEW LOW 30 MIN: CPT | Performed by: STUDENT IN AN ORGANIZED HEALTH CARE EDUCATION/TRAINING PROGRAM

## 2023-12-05 PROCEDURE — 3008F BODY MASS INDEX DOCD: CPT | Performed by: STUDENT IN AN ORGANIZED HEALTH CARE EDUCATION/TRAINING PROGRAM

## 2023-12-05 NOTE — PROGRESS NOTES
ORTHOPAEDIC SURGERY HISTORY & PHYSICAL     Chief Complaint:  Right foot pain    History Of Present Illness  Geri Moss is a 28 y.o. female presents for evaluation of right foot pain as a referral from Dr. Del Cid.  Patient carries a diagnosis of epilepsy and had a seizure in December 2022.  She sustained multiple injuries to her right leg and eventually underwent ACL reconstruction which she has recovered well from.  Unfortunately she has continued with 6-7 out of 10 pain in her right foot that is worse with ambulation and prolonged standing.  This is relatively unchanged from the time of injury last year.  She has not been taking any anti-inflammatory medications for her foot.  She has not had any prior CSI nor braces, orthotics or inserts for her shoes.  She denies any interval trauma or associated numbness, tingling and weakness.     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       Surgical History  Recent Surgeries in Orthopaedic Surgery            No cases to display             Social History  Social History     Socioeconomic History    Marital status: Single     Spouse name: None    Number of children: None    Years of education: None    Highest education level: None   Occupational History    None   Tobacco Use    Smoking status: Never    Smokeless tobacco: Never   Vaping Use    Vaping Use: Never used   Substance and Sexual Activity    Alcohol use: Never    Drug use: Never    Sexual activity: None   Other Topics Concern    None   Social History Narrative    None     Social Determinants of Health     Financial Resource Strain: Not on file   Food Insecurity: Not on file   Transportation Needs: Not on file   Physical Activity: Not on file   Stress: Not on file   Social Connections: Not on file   Intimate Partner Violence: Not on file   Housing Stability: Not on file       Family History  Family  History   Problem Relation Name Age of Onset    Other (HTN) Mother      Diabetes type II Mother          WITH DIABETIC MACULAR EDEMA OF RIGHT EYE RECOLVED AFTER TREATMENTS        Allergies  No Known Allergies    Review of Systems  REVIEW OF SYSTEMS  Constitutional: no unplanned weight loss  Psychiatric: no suicidal ideation  ENT: no vision changes, no sinus problems  Pulmonary: no shortness of breath during office visit today  Lymphatic: no enlarged lymph nodes  Cardiovascular: no chest pain or shortness of breath during office visit today  Gastrointestinal: no stomach problems  Genitourinary: no dysuria   Skin: no rashes  Endocrine: no thyroid problems  Neurological: no headache, no numbness  Hematological: no easy bruising  Musculoskeletal: Right foot pain    Physical Exam  PHYSICAL EXAMINATION  Constitutional Exam: well developed and well nourished  Psychiatric Exam: alert and oriented, appropriate mood and behavior  Eye Exam: EOMI  Pulmonary Exam: breathing non-labored, no apparent distress  Lymphatic exam: no appreciable lymphadenopathy in the lower extremities  Cardiovascular exam: RRR to peripheral palpation, DP pulses 2+, PT 2+, toes are pink with good capillary refill, no pitting edema  Skin exam: no open lesions, rashes, abrasions or ulcerations  Neurological exam: sensation to light touch intact in both lower extremities in peripheral and dermatomal distributions (except for any abnormalities noted in musculoskeletal exam)    Musculoskeletal exam: Right lower extremity examination.  Patient pain localized to the dorsal medial midfoot.  She is focally tender to palpation there.  She is a negative Tinel's overlying the anterior tarsal tunnel.  Patient has minimal discomfort with attempted midfoot pronation/supination/varus/valgus/adduction/abduction stress.  Patient has pain-free but limited ankle range of motion, she lacks approximately 40 degrees of dorsiflexion bilaterally that does not improve with knee  flexion.  Patient has pain-free and supple subtalar joint range of motion.  Patient has sensation intact light touch grossly in a saphenous, sural, superficial peroneal, deep peroneal and tibial nerve distribution.  She has 5 out of 5 strength in plantarflexion, dorsiflexion and EHL, she continues with baseline 4- out of 5 strength in FHL.  She has 2+ DP/PT pulse palpated.    Last Recorded Vitals  There were no vitals taken for this visit.    Laboratory Results    No results found for this or any previous visit (from the past 24 hour(s)).    Radiology Results   X-ray imaging 3 view weightbearing bilateral right foot with standing AP comparison film reviewed from 12/05/2023 and independently evaluated by me demonstrates no obvious right 1 2 intermetatarsal widening, right side measures 2.0 and left side measures 3.6.  No obvious fracture or dislocation noted otherwise.    MRI right foot reviewed from 11/17/2023 and independently evaluated by me demonstrates attenuation of Lisfranc ligament without full-thickness tear evident.    Assessment/Plan:  28-year-old female with past medical history of epilepsy and BMI = 55 who presents with right chronic midfoot sprain in the setting of Achilles contracture.  I have reviewed the diagnosis and treatment options with the patient.  In my impression the patient may continue weightbearing as tolerated in her bilateral lower extremities.  I will provide her with information regarding a carbon fiber insert, unfortunately we do not have her size in stock today, to offload her midfoot.  I also counseled the patient regarding the need for sturdy, supportive and accommodative footwear.  I will formally refer the patient to physical therapy to work on Achilles stretching.  I discussed with the patient that due to the chronicity of her pain that her right foot is unlikely to function in the same capacity as her left.  I will plan to see the patient back in approximately 6 weeks to  monitor response to treatment.  I reviewed with the patient if she is not clinically improving that we could consider a right midfoot arthrodesis for definitive treatment of her midfoot as well as Achilles tendon lengthening.  I have encouraged the patient to contact the office if she develops any new pain, worsening symptoms or if she has any further questions. Upon return, patient would not require further imaging.    Please provide a copy of this encounter to Dr. Del Cid.    Delmer Hussein MD, SANCHEZ  Department of Orthopaedic Surgery  [facility]    The diagnosis and treatment plan were reviewed with the patient. All questions were answered. The patient verbalized understanding of the treatment plan. There were no barriers to understanding identified.    Note dictated with [employer] software.  Completed without full type editing and sent to avoid delay.

## 2023-12-13 ENCOUNTER — APPOINTMENT (OUTPATIENT)
Dept: PHYSICAL THERAPY | Facility: CLINIC | Age: 28
End: 2023-12-13
Payer: MEDICARE

## 2023-12-26 ENCOUNTER — EVALUATION (OUTPATIENT)
Dept: PHYSICAL THERAPY | Facility: CLINIC | Age: 28
End: 2023-12-26
Payer: MEDICARE

## 2023-12-26 DIAGNOSIS — M79.671 BILATERAL FOOT PAIN: Primary | ICD-10-CM

## 2023-12-26 DIAGNOSIS — Z87.828 HISTORY OF FOOT SPRAIN: ICD-10-CM

## 2023-12-26 DIAGNOSIS — M79.672 BILATERAL FOOT PAIN: Primary | ICD-10-CM

## 2023-12-26 PROCEDURE — 97110 THERAPEUTIC EXERCISES: CPT | Mod: GP

## 2023-12-26 PROCEDURE — 97161 PT EVAL LOW COMPLEX 20 MIN: CPT | Mod: GP

## 2023-12-26 ASSESSMENT — PATIENT HEALTH QUESTIONNAIRE - PHQ9
2. FEELING DOWN, DEPRESSED OR HOPELESS: NOT AT ALL
SUM OF ALL RESPONSES TO PHQ9 QUESTIONS 1 AND 2: 0
1. LITTLE INTEREST OR PLEASURE IN DOING THINGS: NOT AT ALL

## 2023-12-26 ASSESSMENT — ENCOUNTER SYMPTOMS
DEPRESSION: 0
LOSS OF SENSATION IN FEET: 0
OCCASIONAL FEELINGS OF UNSTEADINESS: 0

## 2023-12-26 ASSESSMENT — PAIN - FUNCTIONAL ASSESSMENT: PAIN_FUNCTIONAL_ASSESSMENT: 0-10

## 2023-12-26 ASSESSMENT — PAIN SCALES - GENERAL: PAINLEVEL_OUTOF10: 2

## 2023-12-26 NOTE — PROGRESS NOTES
Physical Therapy    Physical Therapy Evaluation and Treatment      Patient Name: Geri Moss  MRN: [id number]  Today's Date: 12/26/2023  Time Calculation  Start Time: 1615  Stop Time: 1647  Time Calculation (min): 32 min    Assessment:    Geri Moss, a 28 y.o. female, arrives to outpatient PT c/o R foot pain. Pt presents with the following impairments: R foot pain, deficits in R ankle ROM, restriction of R ankle musculature, deficits in R ankle and LE musculature strength, and deficits in functional mobility per LEFS score. These impairments contribute to difficulty in activity limitations and participation restrictions including standing, ambulation, stair climbing, and performing household/community duties. The pt will benefit from skilled PT services 2x/week for 4 weeks to address the above stated impairments and functional limitations to maximize participation and ease in household and social related activities. The pt has a fair prognosis when considering positive factors including age and PLOF with barriers such as chronicity of injury/pain. Educated in R ankle/foot ROM/strengthening exercises for HEP with verbal cues to perform within painfree ROM. HEP handout provided. No change in pain post treatment. The pt verbalized understanding and agreement to goals and POC. Thank you for this referral and please call [phone] with any questions or concerns.    Plan:  OP PT Plan  Treatment/Interventions: Aquatic therapy, Cryotherapy, Education/ Instruction, Gait training, Manual therapy, Taping techniques, Therapeutic activities, Therapeutic exercises, Ultrasound  PT Plan: Skilled PT  PT Frequency: 2 times per week  Duration: 4 weeks for 8 additional visits in POC  Onset Date: 12/01/22  Certification Period Start Date: 12/26/23  Certification Period End Date: 03/25/24    Current Problem:   1. Bilateral foot pain  Referral to Physical Therapy    Follow Up In Physical Therapy      2. History of foot  sprain  Referral to Physical Therapy    Follow Up In Physical Therapy          Subjective    General:   General  Reason for Referral: B foot pain  Referred By: Keenan CAST  Patient reporting B foot pain R>L since December of last year from seizure. Patient reports that R foot has lis franc injury. Currently attempting to avoid surgery. Pain more present with weightbearing and ambulation. No pain when sitting or sleeping. Sitting for prolonged period of time will lead to swelling and tingling of R LE.   Precautions:  Precautions  STEADI Fall Risk Score (The score of 4 or more indicates an increased risk of falling): 0  Precautions Comment: Seizure precautions; R LE WBAT; hx of migraines; Hx of R ACL repair 2023  Pain:  Pain Assessment  Pain Assessment: 0-10  Pain Score: 2  Pain Type: Chronic pain  Pain Location: Foot  Pain Orientation: Right  Home Livin set of stairs to ascend/descend for laundry  Prior Level of Function:   Independent in all ADLs/iADLs; works for [employer]    Objective     ANKLE    Observation  Observation Comment: Increased edema of R dorsum of foot;  Ankle Palpation/Joint Mobility Assessment  Palpation/Joint Mobility Comment: Tenderness of R achilles, R dorsum of foot; Mod-severe restriction of R achilles  Ankle AROM  R ankle dorsiflexion: (10°): 9 deg from neutral  L ankle dorsiflexion: (10°): 5 deg  R ankle plantarflexion: (40°): 45 deg  L ankle plantarflexion: (40°): 65 deg  R ankle inversion: (30°): 20 deg  L ankle inversion: (30°): 30 deg  R ankle eversion: (20°): 4 deg  L ankle eversion: (20°): 18 deg    Ankle MMT  R ankle dorsiflexion: (5/5): 4  L ankle dorsiflexion: (5/5): 5  R ankle plantarflexion: (5/5): 4  L ankle plantarflexion: (5/5): 5  R ankle inversion: (5/5): 4  L ankle inversion: (5/5): 5  R ankle eversion: (5/5): 4  L ankle eversion: (5/5): 5  Gait   Antalgic gait of R LE; decreased toe-off during terminal stance of R LE    Not formally tested--assumed weakness of R LE due  to previous injury  Outcome Measures:  Other Measures  Lower Extremity Funtional Score (LEFS): 47/80     Treatments:  Therapeutic Exercise  Therapeutic Exercise Performed: Yes  R gastroc stretch with strap 3x20 second  Fwd lunge at 4 in step to promote R ankle dorsiflexion x10  Towel scrunches R LE x10  Seated heel/toe raise bilateral x10    EDUCATION:  Outpatient Education  Individual(s) Educated: Patient  Education Provided: POC, Home Exercise Program  Risk and Benefits Discussed with Patient/Caregiver/Other: yes  Patient/Caregiver Demonstrated Understanding: yes  Plan of Care Discussed and Agreed Upon: yes  Patient Response to Education: Patient/Caregiver Verbalized Understanding of Information, Patient/Caregiver Asked Appropriate Questions, Patient/Caregiver Performed Return Demonstration of Exercises/Activities  Education Comment: Access Code: [email or web address]  URL: [email or web address]  Date: 12/26/2023  Prepared by: Karen Michaud    Exercises  - Long Sitting Calf Stretch with Strap  - 1 x daily - 7 x weekly - 1 sets - 3 reps - 20-30 second hold  - Standard Lunge  - 1 x daily - 7 x weekly - 1 sets - 10 reps - 5 second hold  - Towel Scrunches  - 1 x daily - 7 x weekly - 1-2 sets - 10 reps  - Seated Heel Toe Raises  - 1 x daily - 7 x weekly - 1-2 sets - 10 reps    Goals:  Active       PT Problem       PT Goals       Start:  12/26/23    Expected End:  02/06/24       Increase in LEFS score by 10 points to demonstrate improved functional mobility of B ankles for ease with standing and walking.-Week 4  Improved gross R ankle, knee, hip musculature strength 5/5 MMT to increase stability/support with standing and ambulation at home and community.-Week 4  Decrease in baseline pain of R foot 0/10 to demonstrate improved QOL-Week 4  Improved R ankle AROM DF: 5 deg , PF: 65 deg, INV: 30 deg, EV: 10 deg painfree for improved ease with ADL/iADL completion in standing/weightbearing-Week 4  Patient will  "demonstrate compliance in their home exercise program in order to promote independence in self management of functional mobility.-Week 2  Patient Stated Goal: Less pain and to hopefully avoid surgery repair\"               "

## 2024-01-05 ENCOUNTER — TREATMENT (OUTPATIENT)
Dept: PHYSICAL THERAPY | Facility: CLINIC | Age: 29
End: 2024-01-05
Payer: MEDICARE

## 2024-01-05 DIAGNOSIS — M79.672 BILATERAL FOOT PAIN: ICD-10-CM

## 2024-01-05 DIAGNOSIS — Z87.828 HISTORY OF FOOT SPRAIN: ICD-10-CM

## 2024-01-05 DIAGNOSIS — M79.671 BILATERAL FOOT PAIN: ICD-10-CM

## 2024-01-05 PROCEDURE — 97110 THERAPEUTIC EXERCISES: CPT | Mod: GP,CQ

## 2024-01-05 ASSESSMENT — PAIN SCALES - GENERAL: PAINLEVEL_OUTOF10: 5 - MODERATE PAIN

## 2024-01-05 ASSESSMENT — PAIN - FUNCTIONAL ASSESSMENT: PAIN_FUNCTIONAL_ASSESSMENT: 0-10

## 2024-01-05 NOTE — PROGRESS NOTES
Physical Therapy Treatment    Patient Name: Geri Moss  MRN: [id number]  Today's Date: 1/5/2024  Time Calculation  Start Time: 1015  Stop Time: 1042  Time Calculation (min): 27 min  Visit: 2/9    Assessment:   Patient demo's challenges with 4 way ankle, and weakness noted in foot intrinsics this date. Tolerates session with no c/o increased symptoms.     Plan:  Continue to work on improving strength and decreasing pain to be able to tolerate prolonged standing with little to no difficulty. -AB.     OP PT Plan  Treatment/Interventions: Aquatic therapy, Cryotherapy, Education/ Instruction, Gait training, Manual therapy, Taping techniques, Therapeutic activities, Therapeutic exercises, Ultrasound  PT Plan: Skilled PT  PT Frequency: 2 times per week  Duration: 4 weeks for 8 additional visits in POC  Onset Date: 12/01/22  Certification Period Start Date: 12/26/23  Certification Period End Date: 03/25/24    Current Problem  Problem List Items Addressed This Visit             ICD-10-CM    Bilateral foot pain M79.671, M79.672    History of foot sprain Z87.828       Subjective   General   Patient states that she feels sore today d/t working out yesterday. States that her knee and foot are both sore.     Precautions  Precautions  Precautions Comment: Seizure precautions; R LE WBAT; hx of migraines; Hx of R ACL repair 4/2023    Pain  Pain Assessment: 0-10  Pain Score: 5 - Moderate pain  Pain Type: Chronic pain  Pain Location: Foot  Pain Orientation: Right      Treatments:  Therapeutic Exercise: 25 minutes, 2 units  Recumbent bike x5' (N)  Slantboard 2x1' (N)  R gastroc stretch with strap 3x30 second (P, hold time)   Fwd lunge at 4 in step to promote R ankle dorsiflexion x10  Big toe extension x10 R (N)  4 toe extension x10 R (N)  Towel scrunches R LE x10  R foot doming x10 R (N)  Seated heel/toe raise bilateral x15 (P, reps)   4 way ankle x10 R Green Tband (N)    OP EDUCATION:  Outpatient Education  Individual(s)  "Educated: Patient  Education Provided: POC, Home Exercise Program  Risk and Benefits Discussed with Patient/Caregiver/Other: yes  Patient/Caregiver Demonstrated Understanding: yes  Plan of Care Discussed and Agreed Upon: yes  Patient Response to Education: Patient/Caregiver Verbalized Understanding of Information, Patient/Caregiver Asked Appropriate Questions, Patient/Caregiver Performed Return Demonstration of Exercises/Activities  Education Comment: Access Code: [email or web address]  URL: [email or web address]  Date: 12/26/2023  Prepared by: Karen Michaud    Exercises  - Long Sitting Calf Stretch with Strap  - 1 x daily - 7 x weekly - 1 sets - 3 reps - 20-30 second hold  - Standard Lunge  - 1 x daily - 7 x weekly - 1 sets - 10 reps - 5 second hold  - Towel Scrunches  - 1 x daily - 7 x weekly - 1-2 sets - 10 reps  - Seated Heel Toe Raises  - 1 x daily - 7 x weekly - 1-2 sets - 10 reps    Goals:  Active       PT Problem       PT Goals       Start:  12/26/23    Expected End:  02/06/24       Increase in LEFS score by 10 points to demonstrate improved functional mobility of B ankles for ease with standing and walking.-Week 4  Improved gross R ankle, knee, hip musculature strength 5/5 MMT to increase stability/support with standing and ambulation at home and community.-Week 4  Decrease in baseline pain of R foot 0/10 to demonstrate improved QOL-Week 4  Improved R ankle AROM DF: 5 deg , PF: 65 deg, INV: 30 deg, EV: 10 deg painfree for improved ease with ADL/iADL completion in standing/weightbearing-Week 4  Patient will demonstrate compliance in their home exercise program in order to promote independence in self management of functional mobility.-Week 2  Patient Stated Goal: Less pain and to hopefully avoid surgery repair\"            "

## 2024-01-08 ENCOUNTER — TREATMENT (OUTPATIENT)
Dept: PHYSICAL THERAPY | Facility: CLINIC | Age: 29
End: 2024-01-08
Payer: MEDICARE

## 2024-01-08 DIAGNOSIS — Z87.828 HISTORY OF FOOT SPRAIN: ICD-10-CM

## 2024-01-08 DIAGNOSIS — M79.672 BILATERAL FOOT PAIN: ICD-10-CM

## 2024-01-08 DIAGNOSIS — M79.671 BILATERAL FOOT PAIN: ICD-10-CM

## 2024-01-08 PROCEDURE — 97110 THERAPEUTIC EXERCISES: CPT | Mod: GP,CQ

## 2024-01-08 ASSESSMENT — PAIN SCALES - GENERAL: PAINLEVEL_OUTOF10: 2

## 2024-01-08 ASSESSMENT — PAIN - FUNCTIONAL ASSESSMENT: PAIN_FUNCTIONAL_ASSESSMENT: 0-10

## 2024-01-08 NOTE — PROGRESS NOTES
"Physical Therapy Treatment    Patient Name: Geri Moss  MRN: [id number]  Today's Date: 1/8/2024  Time Calculation  Start Time: 0745  Stop Time: 0828  Time Calculation (min): 43 min  Visit 3/8    Assessment:   Patient identified by name and date of birth. Patient demonstrated good understanding of exercises and good tolerance of progression of reps. She demonstrated moderate sway with finger touch down at times with SLS and tandem stance.     Plan:  OP PT Plan  Treatment/Interventions: Aquatic therapy, Cryotherapy, Education/ Instruction, Gait training, Manual therapy, Taping techniques, Therapeutic activities, Therapeutic exercises, Ultrasound  PT Plan: Skilled PT  PT Frequency: 2 times per week  Duration: 4 weeks for 8 additional visits in POC  Onset Date: 12/01/22  Certification Period Start Date: 12/26/23  Certification Period End Date: 03/25/24    Current Problem  Problem List Items Addressed This Visit             ICD-10-CM    Bilateral foot pain M79.671, M79.672    History of foot sprain Z87.828       Subjective  Patient reported compliance with % of the time. She reported she started working out the day prior and feels she might be more sore prior. She reported 2/10 pain after treatment.     Precautions  Precautions  Precautions Comment: Seizure precautions; R LE WBAT; hx of migraines; Hx of R ACL repair 4/2023    Pain  Pain Assessment: 0-10  Pain Score: 2  Pain Type: Chronic pain  Pain Location: Foot  Pain Orientation: Right     Treatments:  Therapeutic Exercise  Therapeutic Exercise Performed: Yes  Recumbent bike x5'   Slantboard 2x1'  Fwd lunge at 6 in step to promote R ankle dorsiflexion x10  Step ups 6\" fwd x10 (N)  SLS 3 x 30\" (N)  Tandem stance 2 x 30\" ea lead (N)  Seated heel/toe raise bilateral 2x15 (P, reps)   Big toe extension 2x10 R (P)  4 toe extension 2x10 R (P)  Towel scrunches R LE x2'   R foot doming 2x10 R (P)  4 way ankle x10 R Green Tband (P)  R gastroc stretch with strap 3x30 " "second (P, hold time)   BOSU strap IV/EV x10 5\" Hold (N)     OP EDUCATION:   Access Code: [email or web address]  URL: [email or web address]  Date: 12/26/2023  Prepared by: Karen Michaud    Exercises  - Long Sitting Calf Stretch with Strap  - 1 x daily - 7 x weekly - 1 sets - 3 reps - 20-30 second hold  - Standard Lunge  - 1 x daily - 7 x weekly - 1 sets - 10 reps - 5 second hold  - Towel Scrunches  - 1 x daily - 7 x weekly - 1-2 sets - 10 reps  - Seated Heel Toe Raises  - 1 x daily - 7 x weekly - 1-2 sets - 10 reps     Goals:  Active       PT Problem       PT Goals       Start:  12/26/23    Expected End:  02/06/24       Increase in LEFS score by 10 points to demonstrate improved functional mobility of B ankles for ease with standing and walking.-Week 4  Improved gross R ankle, knee, hip musculature strength 5/5 MMT to increase stability/support with standing and ambulation at home and community.-Week 4  Decrease in baseline pain of R foot 0/10 to demonstrate improved QOL-Week 4  Improved R ankle AROM DF: 5 deg , PF: 65 deg, INV: 30 deg, EV: 10 deg painfree for improved ease with ADL/iADL completion in standing/weightbearing-Week 4  Patient will demonstrate compliance in their home exercise program in order to promote independence in self management of functional mobility.-Week 2  Patient Stated Goal: Less pain and to hopefully avoid surgery repair\"            "

## 2024-01-10 ENCOUNTER — TREATMENT (OUTPATIENT)
Dept: PHYSICAL THERAPY | Facility: CLINIC | Age: 29
End: 2024-01-10
Payer: MEDICARE

## 2024-01-10 DIAGNOSIS — M79.672 BILATERAL FOOT PAIN: ICD-10-CM

## 2024-01-10 DIAGNOSIS — M79.671 BILATERAL FOOT PAIN: ICD-10-CM

## 2024-01-10 DIAGNOSIS — Z87.828 HISTORY OF FOOT SPRAIN: ICD-10-CM

## 2024-01-10 PROCEDURE — 97110 THERAPEUTIC EXERCISES: CPT | Mod: GP,CQ

## 2024-01-10 ASSESSMENT — PAIN - FUNCTIONAL ASSESSMENT: PAIN_FUNCTIONAL_ASSESSMENT: 0-10

## 2024-01-10 ASSESSMENT — PAIN SCALES - GENERAL: PAINLEVEL_OUTOF10: 2

## 2024-01-10 NOTE — PROGRESS NOTES
"Physical Therapy Treatment    Patient Name: Geri Moss  MRN: [id number]  Today's Date: 1/10/2024  Time Calculation  Start Time: 744  Stop Time: 824  Time Calculation (min): 40 min      Assessment:   Patient identified with name and .   Good tolerance and response to treatment with minimal increase in soreness following session. She still experiences some edema after being on feet a lot or driving for extended periods of time.     Plan:  Plan to continue advancing activities to improve functional strength and mobility in RLE.     OP PT Plan  Treatment/Interventions: Aquatic therapy, Cryotherapy, Education/ Instruction, Gait training, Manual therapy, Taping techniques, Therapeutic activities, Therapeutic exercises, Ultrasound  PT Plan: Skilled PT  PT Frequency: 2 times per week  Duration: 4 weeks for 8 additional visits in POC  Onset Date: 22  Certification Period Start Date: 23  Certification Period End Date: 24    Current Problem  Problem List Items Addressed This Visit             ICD-10-CM    Bilateral foot pain M79.671, M79.672    History of foot sprain Z87.828       Subjective   Doing a little better this morning but still get some soreness and swelling once in a while.    Precautions  Precautions  Precautions Comment: Seizure precautions; R LE WBAT; hx of migraines; Hx of R ACL repair 2023    Pain  Pain Assessment: 0-10  Pain Score: 2  Pain Type: Chronic pain  Pain Location: Foot  Pain Orientation: Right    Treatments:  Therapeutic Exercise  Therapeutic Exercise Performed: Yes  Therapeutic Exercise: 38 minutes, 3 units   Recumbent bike x5'   Slantboard 2x1'  Fwd lunge at 6 in step to promote R ankle dorsiflexion x10  Step ups 6\" fwd x10   SLS 3 x 30\"   Tandem stance 2 x 30\" ea lead   Standing heel raises: 2x10  [N]  R foot doming 2x10 R   4 way ankle x10 R Green Tband (PF with BOSU strap)   R gastroc stretch with strap 3x30 second    BOSU strap IV/EV x10 5\" Hold     Not " "Today:  Seated heel/toe raise bilateral 2x15    Big toe extension 2x10 R   4 toe extension 2x10 R (P)  Towel scrunches R LE x2'     OP EDUCATION:   HEP reviewed.    Goals:  Active       PT Problem       PT Goals       Start:  12/26/23    Expected End:  02/06/24       Increase in LEFS score by 10 points to demonstrate improved functional mobility of B ankles for ease with standing and walking.-Week 4  Improved gross R ankle, knee, hip musculature strength 5/5 MMT to increase stability/support with standing and ambulation at home and community.-Week 4  Decrease in baseline pain of R foot 0/10 to demonstrate improved QOL-Week 4  Improved R ankle AROM DF: 5 deg , PF: 65 deg, INV: 30 deg, EV: 10 deg painfree for improved ease with ADL/iADL completion in standing/weightbearing-Week 4  Patient will demonstrate compliance in their home exercise program in order to promote independence in self management of functional mobility.-Week 2  Patient Stated Goal: Less pain and to hopefully avoid surgery repair\"            "

## 2024-01-15 ENCOUNTER — TREATMENT (OUTPATIENT)
Dept: PHYSICAL THERAPY | Facility: CLINIC | Age: 29
End: 2024-01-15
Payer: MEDICARE

## 2024-01-15 DIAGNOSIS — M79.671 BILATERAL FOOT PAIN: ICD-10-CM

## 2024-01-15 DIAGNOSIS — M79.672 BILATERAL FOOT PAIN: ICD-10-CM

## 2024-01-15 DIAGNOSIS — Z87.828 HISTORY OF FOOT SPRAIN: ICD-10-CM

## 2024-01-15 PROCEDURE — 97110 THERAPEUTIC EXERCISES: CPT | Mod: GP,CQ

## 2024-01-15 ASSESSMENT — PAIN SCALES - GENERAL: PAINLEVEL_OUTOF10: 3

## 2024-01-15 ASSESSMENT — PAIN - FUNCTIONAL ASSESSMENT: PAIN_FUNCTIONAL_ASSESSMENT: 0-10

## 2024-01-15 NOTE — PROGRESS NOTES
"Physical Therapy Treatment    Patient Name: Geri Moss  MRN: [id number]  Today's Date: 1/15/2024  Time Calculation  Start Time: 746  Stop Time: 828  Time Calculation (min): 42 min      Assessment:   Patient identified with name and .   Reviewed use of ice to help with edema and soreness. Patient able to tolerate today's session well and denies increase in symptoms afterwards. She has a follow-up tomorrow with .     Plan:  Plan to continue advancing activities to improve functional strength and mobility in RLE.    OP PT Plan  Treatment/Interventions: Aquatic therapy, Cryotherapy, Education/ Instruction, Gait training, Manual therapy, Taping techniques, Therapeutic activities, Therapeutic exercises, Ultrasound  PT Plan: Skilled PT  PT Frequency: 2 times per week  Duration: 4 weeks for 8 additional visits in POC  Onset Date: 22  Certification Period Start Date: 23  Certification Period End Date: 24    Current Problem  Problem List Items Addressed This Visit             ICD-10-CM    Bilateral foot pain M79.671, M79.672    History of foot sprain Z87.828       Subjective   Rode in the car for 4 hrs yesterday so my right foot is a little more achy and stiff this morning.     Precautions  Precautions  Precautions Comment: Seizure precautions; R LE WBAT; hx of migraines; Hx of R ACL repair 2023    Pain  Pain Assessment: 0-10  Pain Score: 3  Pain Type: Chronic pain  Pain Location: Foot  Pain Orientation: Right    Treatments:  Therapeutic Exercise  Therapeutic Exercise Performed: Yes  Therapeutic Exercise: 40 minutes, 3 units   Recumbent bike x5'   Slantboard 2x1'  Fwd lunge at 6 in step to promote R ankle dorsiflexion x10  Step ups 6\" fwd x10; lat x10   SLS 3 x 30\", floor  Tandem stance 2 x 30\" ea lead   Standing heel raises: 2x10, off step  R foot doming 2x10 R   4 way ankle 2x10 R Green Tband (PF with BOSU strap)   R gastroc stretch with strap 3x30 second    BOSU strap IV/EV x10 5\" Hold " "  Small balance board: 2x1'  [N]  Lunge onto BOSU: x10 fwd, x10 lat  [N]     Not Today:  Seated heel/toe raise bilateral 2x15    Big toe extension 2x10 R   4 toe extension 2x10 R (P)  Towel scrunches R LE x2'        OP EDUCATION:   Discussed use of ice to help with edema and soreness.     Goals:  Active       PT Problem       PT Goals       Start:  12/26/23    Expected End:  02/06/24       Increase in LEFS score by 10 points to demonstrate improved functional mobility of B ankles for ease with standing and walking.-Week 4  Improved gross R ankle, knee, hip musculature strength 5/5 MMT to increase stability/support with standing and ambulation at home and community.-Week 4  Decrease in baseline pain of R foot 0/10 to demonstrate improved QOL-Week 4  Improved R ankle AROM DF: 5 deg , PF: 65 deg, INV: 30 deg, EV: 10 deg painfree for improved ease with ADL/iADL completion in standing/weightbearing-Week 4  Patient will demonstrate compliance in their home exercise program in order to promote independence in self management of functional mobility.-Week 2  Patient Stated Goal: Less pain and to hopefully avoid surgery repair\"            "

## 2024-01-16 ENCOUNTER — APPOINTMENT (OUTPATIENT)
Dept: ORTHOPEDIC SURGERY | Facility: HOSPITAL | Age: 29
End: 2024-01-16
Payer: MEDICARE

## 2024-01-17 ENCOUNTER — APPOINTMENT (OUTPATIENT)
Dept: PRIMARY CARE | Facility: CLINIC | Age: 29
End: 2024-01-17
Payer: MEDICARE

## 2024-01-17 ENCOUNTER — APPOINTMENT (OUTPATIENT)
Dept: PHYSICAL THERAPY | Facility: CLINIC | Age: 29
End: 2024-01-17
Payer: MEDICARE

## 2024-01-22 ENCOUNTER — TREATMENT (OUTPATIENT)
Dept: PHYSICAL THERAPY | Facility: CLINIC | Age: 29
End: 2024-01-22
Payer: MEDICARE

## 2024-01-22 DIAGNOSIS — F41.9 ANXIETY: ICD-10-CM

## 2024-01-22 DIAGNOSIS — M79.672 BILATERAL FOOT PAIN: ICD-10-CM

## 2024-01-22 DIAGNOSIS — M79.671 BILATERAL FOOT PAIN: ICD-10-CM

## 2024-01-22 DIAGNOSIS — Z87.828 HISTORY OF FOOT SPRAIN: ICD-10-CM

## 2024-01-22 PROCEDURE — 97110 THERAPEUTIC EXERCISES: CPT | Mod: GP,CQ

## 2024-01-22 ASSESSMENT — PAIN - FUNCTIONAL ASSESSMENT: PAIN_FUNCTIONAL_ASSESSMENT: 0-10

## 2024-01-22 ASSESSMENT — PAIN SCALES - GENERAL: PAINLEVEL_OUTOF10: 0 - NO PAIN

## 2024-01-22 NOTE — PROGRESS NOTES
"Physical Therapy Treatment    Patient Name: Geri Moss  MRN: [id number]  Today's Date: 1/22/2024  Time Calculation  Start Time: 0747  Stop Time: 0828  Time Calculation (min): 41 min      Assessment:   Patient appropriately challenged. Patient tolerates session with mild difficulty, and requires verbal cues to not go down so far on lateral lunge d/t pain in R knee. Patient challenged with eversion motion. Demo's increased symptoms at end of session.      Plan:  Continue to work on improving strength and ROM to be able to tolerate prolonged ambulation with little to no challenges. -AB.     OP PT Plan  Treatment/Interventions: Aquatic therapy, Cryotherapy, Education/ Instruction, Gait training, Manual therapy, Taping techniques, Therapeutic activities, Therapeutic exercises, Ultrasound  PT Plan: Skilled PT  PT Frequency: 2 times per week  Duration: 4 weeks for 8 additional visits in POC  Onset Date: 12/01/22  Certification Period Start Date: 12/26/23  Certification Period End Date: 03/25/24    Current Problem  Problem List Items Addressed This Visit             ICD-10-CM    Bilateral foot pain M79.671, M79.672    History of foot sprain Z87.828       Subjective   General  General Comment: Visit: 6/8  Patient states that she doesn't feel like her foot is getting any better. States that she isn't having pain currently, but states that she hasn't really done much. Patient states that she still has swelling, but states that she is icing at home. Reports doing her HEP.    Precautions  Precautions  Precautions Comment: Seizure precautions; R LE WBAT; hx of migraines; Hx of R ACL repair 4/2023    Pain  Pain Assessment: 0-10  Pain Score: 0 - No pain  Pain Location: Foot  Pain Orientation: Right      Treatments:  Therapeutic Exercise: 38 minutes, 3 units  Recumbent bike x5'   Slantboard 2x1'  Fwd lunge at 6 in step to promote R ankle dorsiflexion x10  Step ups 6\" fwd 2x10; lat 2x10 (P, reps)    SLS 3 x 30\", floor  Tandem " "stance 3 x 30\" ea lead (P, reps)  Standing heel raises: 2x10, off step  R foot doming 2x10 R   4 way ankle 2x10 R BOSU band   R gastroc stretch with strap 3x30 second    BOSU strap IV/EV x10 5\" Hold   Small balance board: 2x1'    Lunge onto BOSU: x15 fwd, x15 lat  [P, reps)     Not Today:  Seated heel/toe raise bilateral 2x15    Big toe extension 2x10 R   4 toe extension 2x10 R (P)  Towel scrunches R LE x2'     OP EDUCATION:   Access Code: [email or web address]  URL: [email or web address]  Date: 12/26/2023  Prepared by: Karen Michaud    Exercises  - Long Sitting Calf Stretch with Strap  - 1 x daily - 7 x weekly - 1 sets - 3 reps - 20-30 second hold  - Standard Lunge  - 1 x daily - 7 x weekly - 1 sets - 10 reps - 5 second hold  - Towel Scrunches  - 1 x daily - 7 x weekly - 1-2 sets - 10 reps  - Seated Heel Toe Raises  - 1 x daily - 7 x weekly - 1-2 sets - 10 reps     Goals:  Active       PT Problem       PT Goals       Start:  12/26/23    Expected End:  02/06/24       Increase in LEFS score by 10 points to demonstrate improved functional mobility of B ankles for ease with standing and walking.-Week 4  Improved gross R ankle, knee, hip musculature strength 5/5 MMT to increase stability/support with standing and ambulation at home and community.-Week 4  Decrease in baseline pain of R foot 0/10 to demonstrate improved QOL-Week 4  Improved R ankle AROM DF: 5 deg , PF: 65 deg, INV: 30 deg, EV: 10 deg painfree for improved ease with ADL/iADL completion in standing/weightbearing-Week 4  Patient will demonstrate compliance in their home exercise program in order to promote independence in self management of functional mobility.-Week 2  Patient Stated Goal: Less pain and to hopefully avoid surgery repair\"            "

## 2024-01-23 ENCOUNTER — OFFICE VISIT (OUTPATIENT)
Dept: ORTHOPEDIC SURGERY | Facility: CLINIC | Age: 29
End: 2024-01-23
Payer: MEDICARE

## 2024-01-23 DIAGNOSIS — M67.01 CONTRACTURE OF RIGHT ACHILLES TENDON: ICD-10-CM

## 2024-01-23 DIAGNOSIS — M79.2 NEURITIS: ICD-10-CM

## 2024-01-23 DIAGNOSIS — Z87.828 HISTORY OF FOOT SPRAIN: ICD-10-CM

## 2024-01-23 PROCEDURE — 99212 OFFICE O/P EST SF 10 MIN: CPT | Performed by: STUDENT IN AN ORGANIZED HEALTH CARE EDUCATION/TRAINING PROGRAM

## 2024-01-23 PROCEDURE — 3008F BODY MASS INDEX DOCD: CPT | Performed by: STUDENT IN AN ORGANIZED HEALTH CARE EDUCATION/TRAINING PROGRAM

## 2024-01-23 PROCEDURE — 1036F TOBACCO NON-USER: CPT | Performed by: STUDENT IN AN ORGANIZED HEALTH CARE EDUCATION/TRAINING PROGRAM

## 2024-01-23 RX ORDER — ESCITALOPRAM OXALATE 20 MG/1
20 TABLET ORAL DAILY
Qty: 90 TABLET | Refills: 1 | Status: SHIPPED | OUTPATIENT
Start: 2024-01-23

## 2024-01-23 RX ORDER — CAPSAICIN 0 G/G
CREAM TOPICAL 4 TIMES DAILY PRN
Qty: 56.6 G | Refills: 0 | Status: SHIPPED | OUTPATIENT
Start: 2024-01-23 | End: 2024-02-22

## 2024-01-23 ASSESSMENT — PAIN SCALES - GENERAL: PAINLEVEL_OUTOF10: 4

## 2024-01-23 NOTE — PROGRESS NOTES
This ORTHOPAEDIC SURGERY HISTORY & PHYSICAL     Chief Complaint:  Right foot pain    History Of Present Illness  Geri Moss is a 29 y.o. female presents for evaluation of right foot pain as a referral from Dr. Del Cid.  Patient carries a diagnosis of epilepsy and had a seizure in December 2022.  She sustained multiple injuries to her right leg and eventually underwent ACL reconstruction which she has recovered well from.  Unfortunately she has continued with 6-7 out of 10 pain in her right foot that is worse with ambulation and prolonged standing.  This is relatively unchanged from the time of injury last year.  She has not been taking any anti-inflammatory medications for her foot.  She has not had any prior CSI nor braces, orthotics or inserts for her shoes.  She denies any interval trauma or associated numbness, tingling and weakness.    01/23/2024: Patient returns for follow-up of her right foot pain.  Patient has been in physical therapy.  Pain overall appears to be decreasing, pain is currently rated as 4 out of 10.  Pain on examination today appears more distal than previous examinations.  She is continuing to work on stretching and therapy.  She denies any trauma and has no associated numbness, tingling or weakness.     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       Surgical History  Recent Surgeries in Orthopaedic Surgery            No cases to display             Social History  Social History     Socioeconomic History    Marital status: Single     Spouse name: None    Number of children: None    Years of education: None    Highest education level: None   Occupational History    None   Tobacco Use    Smoking status: Never    Smokeless tobacco: Never   Vaping Use    Vaping Use: Never used   Substance and Sexual Activity    Alcohol use: Never    Drug use: Never    Sexual activity: None    Other Topics Concern    None   Social History Narrative    None     Social Determinants of Health     Financial Resource Strain: Not on file   Food Insecurity: Not on file   Transportation Needs: Not on file   Physical Activity: Not on file   Stress: Not on file   Social Connections: Not on file   Intimate Partner Violence: Not on file   Housing Stability: Not on file       Family History  Family History   Problem Relation Name Age of Onset    Other (HTN) Mother      Diabetes type II Mother          WITH DIABETIC MACULAR EDEMA OF RIGHT EYE RECOLVED AFTER TREATMENTS        Allergies  No Known Allergies    Review of Systems  REVIEW OF SYSTEMS  Constitutional: no unplanned weight loss  Psychiatric: no suicidal ideation  ENT: no vision changes, no sinus problems  Pulmonary: no shortness of breath during office visit today  Lymphatic: no enlarged lymph nodes  Cardiovascular: no chest pain or shortness of breath during office visit today  Gastrointestinal: no stomach problems  Genitourinary: no dysuria   Skin: no rashes  Endocrine: no thyroid problems  Neurological: no headache, no numbness  Hematological: no easy bruising  Musculoskeletal: Right foot pain    Physical Exam  PHYSICAL EXAMINATION  Constitutional Exam: well developed and well nourished  Psychiatric Exam: alert and oriented, appropriate mood and behavior  Eye Exam: EOMI  Pulmonary Exam: breathing non-labored, no apparent distress  Lymphatic exam: no appreciable lymphadenopathy in the lower extremities  Cardiovascular exam: RRR to peripheral palpation, DP pulses 2+, PT 2+, toes are pink with good capillary refill, no pitting edema  Skin exam: no open lesions, rashes, abrasions or ulcerations  Neurological exam: sensation to light touch intact in both lower extremities in peripheral and dermatomal distributions (except for any abnormalities noted in musculoskeletal exam)    Musculoskeletal exam:  Right lower extremity examination.  Patient previously  localized to the dorsal medial midfoot, she is nontender to palpation on examination today.  There is a focal region of soft tissue swelling between the first and second metatarsal that appears to be her primary source of pain.  She has a negative Tinel's overlying the tarsal tunnel and this more painful region with no obvious radiation into the first webspace.  Patient has no obvious pain/discomfort with attempted midfoot pronation/supination/varus/valgus/adduction/abduction stress.  Patient has pain-free but continued limited ankle range of motion, she lacks approximate 40 degrees of dorsiflexion bilaterally that does not improve with knee flexion.  Patient has pain-free and supple subtalar joint range of motion.  Patient has sensation intact light touch grossly to saphenous, sural, superficial peroneal, deep peroneal and tibial nerve distribution.  She has 5 out of 5 strength in plantarflexion, dorsiflexion and EHL.  She has 2+ DP/PT pulse palpated.    Last Recorded Vitals  There were no vitals taken for this visit.    Laboratory Results    No results found for this or any previous visit (from the past 24 hour(s)).    Radiology Results   No new imaging at this visit.    Assessment/Plan:  29-year-old female with past medical history of epilepsy and BMI = 55 who presents with right chronic midfoot sprain in the setting of Achilles contracture and painful dorsal soft tissue swelling of the forefoot clinically most consistent with neuritis. I have reviewed the diagnosis and treatment options with the patient.  In my impression the patient may continue weightbearing as tolerated in right lower extremity.  I recommend the patient continue in physical therapy for Achilles stretching and strengthening as well as desensitization training with consideration for other modalities.  I will provide her with a prescription for capsaicin for presumed neuritis.  I will plan to see the patient back in approximately 6 weeks to  follow her clinical course.  If the patient is not clinically improving then we may consider MRI with and without contrast to more completely evaluate her forefoot.  Upon return, patient would not require further imaging.    Delmer Hussein MD, SANCHEZ  Department of Orthopaedic Surgery  [facility]    The diagnosis and treatment plan were reviewed with the patient. All questions were answered. The patient verbalized understanding of the treatment plan. There were no barriers to understanding identified.    Note dictated with VideoStep software.  Completed without full type editing and sent to avoid delay.

## 2024-01-24 ENCOUNTER — TREATMENT (OUTPATIENT)
Dept: PHYSICAL THERAPY | Facility: CLINIC | Age: 29
End: 2024-01-24
Payer: MEDICARE

## 2024-01-24 DIAGNOSIS — M79.672 BILATERAL FOOT PAIN: ICD-10-CM

## 2024-01-24 DIAGNOSIS — M79.671 BILATERAL FOOT PAIN: ICD-10-CM

## 2024-01-24 DIAGNOSIS — Z87.828 HISTORY OF FOOT SPRAIN: ICD-10-CM

## 2024-01-24 PROCEDURE — 97110 THERAPEUTIC EXERCISES: CPT | Mod: GP,CQ

## 2024-01-24 ASSESSMENT — PAIN SCALES - GENERAL: PAINLEVEL_OUTOF10: 0 - NO PAIN

## 2024-01-24 ASSESSMENT — PAIN - FUNCTIONAL ASSESSMENT: PAIN_FUNCTIONAL_ASSESSMENT: 0-10

## 2024-01-24 NOTE — PROGRESS NOTES
"Physical Therapy Treatment    Patient Name: Geri Moss  MRN: [id number]  Today's Date: 2024  Time Calculation  Start Time: 746  Stop Time: 828  Time Calculation (min): 42 min      Assessment:   Patient identified with name and .   Good tolerance to session without increase in pain, only mild fatigue. HEP was reviewed and patient indicated good compliance and understanding. Deferred lateral lunges onto BOSU secondary to knee discomfort; no knee pain during fwd lunges.    Plan:  Will continue skilled P.T. as poc to improve stability and strength in right foot/ankle to prevent re-injury.    OP PT Plan  Treatment/Interventions: Aquatic therapy, Cryotherapy, Education/ Instruction, Gait training, Manual therapy, Taping techniques, Therapeutic activities, Therapeutic exercises, Ultrasound  PT Plan: Skilled PT  PT Frequency: 2 times per week  Duration: 4 weeks for 8 additional visits in POC  Onset Date: 22  Certification Period Start Date: 23  Certification Period End Date: 24    Current Problem  Problem List Items Addressed This Visit             ICD-10-CM    Bilateral foot pain M79.671, M79.672    History of foot sprain Z87.828       Subjective   Saw my Dr yesterday and they're happy with my progress but want me to continue therapy for about 6 more wks.  The foot is feeling pretty good this morning, not much pain at all.    Precautions  Precautions  Precautions Comment: Seizure precautions; R LE WBAT; hx of migraines; Hx of R ACL repair 2023    Pain  Pain Assessment: 0-10  Pain Score: 0 - No pain  Pain Location: Foot  Pain Orientation: Right    Treatments:  Therapeutic Exercise  Therapeutic Exercise Performed: Yes  Therapeutic Exercise: 39 minutes, 3 units   Recumbent bike x5'   Slantboard 2x1'  Fwd lunge at 6 in step to promote R ankle dorsiflexion x10  Step ups 6\" fwd 2x10; lat 2x10     SLS 3 x 30\", floor  (will progress to Airex pad)  Tandem stance 3 x 30\" ea lead   Small balance " "board: 2x1'    Lunge onto BOSU: 2x10 fwd  (2x10 lat - X, too painful for R knee)  Standing heel raises: 2x10, off step  R gastroc stretch with strap 3x30 second    BOSU strap IV/EV x10 5\" Hold   Ankle DF, INV, ER: blue band, 2x10     Not Today:  4 way ankle 2x10 R BOSU band  R foot doming 2x10 R   Seated heel/toe raise bilateral 2x15    Big toe extension 2x10 R   4 toe extension 2x10 R (P)  Towel scrunches R LE x2'     OP EDUCATION:   Reviewed:   Access Code: [email or web address]  URL: [email or web address]  Date: 12/26/2023  Prepared by: Karen Michaud    Exercises  - Long Sitting Calf Stretch with Strap  - 1 x daily - 7 x weekly - 1 sets - 3 reps - 20-30 second hold  - Standard Lunge  - 1 x daily - 7 x weekly - 1 sets - 10 reps - 5 second hold  - Towel Scrunches  - 1 x daily - 7 x weekly - 1-2 sets - 10 reps  - Seated Heel Toe Raises  - 1 x daily - 7 x weekly - 1-2 sets - 10 reps      Goals:  Active       PT Problem       PT Goals       Start:  12/26/23    Expected End:  02/06/24       Increase in LEFS score by 10 points to demonstrate improved functional mobility of B ankles for ease with standing and walking.-Week 4  Improved gross R ankle, knee, hip musculature strength 5/5 MMT to increase stability/support with standing and ambulation at home and community.-Week 4  Decrease in baseline pain of R foot 0/10 to demonstrate improved QOL-Week 4  Improved R ankle AROM DF: 5 deg , PF: 65 deg, INV: 30 deg, EV: 10 deg painfree for improved ease with ADL/iADL completion in standing/weightbearing-Week 4  Patient will demonstrate compliance in their home exercise program in order to promote independence in self management of functional mobility.-Week 2  Patient Stated Goal: Less pain and to hopefully avoid surgery repair\"            "

## 2024-01-29 ENCOUNTER — TREATMENT (OUTPATIENT)
Dept: PHYSICAL THERAPY | Facility: CLINIC | Age: 29
End: 2024-01-29
Payer: MEDICARE

## 2024-01-29 DIAGNOSIS — M79.672 BILATERAL FOOT PAIN: Primary | ICD-10-CM

## 2024-01-29 DIAGNOSIS — M79.671 BILATERAL FOOT PAIN: Primary | ICD-10-CM

## 2024-01-29 DIAGNOSIS — Z87.828 HISTORY OF FOOT SPRAIN: ICD-10-CM

## 2024-01-29 PROCEDURE — 97110 THERAPEUTIC EXERCISES: CPT | Mod: GP

## 2024-01-29 ASSESSMENT — PAIN - FUNCTIONAL ASSESSMENT: PAIN_FUNCTIONAL_ASSESSMENT: 0-10

## 2024-01-29 ASSESSMENT — PAIN SCALES - GENERAL: PAINLEVEL_OUTOF10: 0 - NO PAIN

## 2024-01-29 NOTE — PROGRESS NOTES
Physical Therapy Treatment    Patient Name: Geri Moss  MRN: [id number]  Today's Date: 1/29/2024  Time Calculation  Start Time: 0931  Stop Time: 0959  Time Calculation (min): 28 min      Assessment:   Geri Moss is progressing well through their POC addressing R foot pain. Pt has attended 8 PT sessions including initial evaluation with therapeutic exercise and HEP interventions. The pt demonstrates and verbalizes improvements in R foot pain, R ankle ROM, R ankle musculature strength, and functional mobility of R foot per LEFS score. Patient is partially meeting or met all current PT goals. Emphasis of treatment for R ankle ROM.    The pt will benefit from continued skilled PT services to address the above stated impairments and functional limitations to maximize participation and ease in household and social related activities. Plan to focus on therapeutic exercise for R ankle/foot mobility and strengthening as well as trial of manual therapy for reducing restrictions and modulating pain of R foot. Patient verbalized understanding and agreement to POC.    Plan: Trial of STM/IASTM of R foot/ankle/achilles to improve R ankle mobility/reduce restrictions as well as progress with therapeutic exercise to improve R foot mobility/strength/stability to ease ability with prolonged standing/ambulation    OP PT Plan  Treatment/Interventions: Aquatic therapy, Cryotherapy, Education/ Instruction, Gait training, Manual therapy, Taping techniques, Therapeutic activities, Therapeutic exercises, Ultrasound (IASTM/cupping)  PT Plan: Skilled PT  PT Frequency: 2 times per week  Duration: 6 weeks for 12 additional visits in POC  Onset Date: 12/01/22  Certification Period Start Date: 12/26/23  Certification Period End Date: 03/25/24    Current Problem  Problem List Items Addressed This Visit             ICD-10-CM    Bilateral foot pain - Primary M79.671, M79.672    Relevant Orders    Follow Up In Physical Therapy    History  "of foot sprain Z87.828    Relevant Orders    Follow Up In Physical Therapy         Subjective   Patient reports 0/10 pain of R foot this date. States her R knee is hurting today but possibly from completion of workout last night. Feels that the R foot is improving but continues to be sore on the dorsum on the foot especially with ambulation. Compliant in HEP. Sees referring provider at the end of February.    Precautions  Precautions  Precautions Comment: Seizure precautions; R LE WBAT; hx of migraines; Hx of R ACL repair 4/2023    Pain  Pain Assessment: 0-10  Pain Score: 0 - No pain  Pain Location: Foot  Pain Orientation: Right  OBJECTIVE    Ankle AROM  R ankle dorsiflexion: (10°): 9 deg from neutral>7 deg DF  R ankle inversion: (30°): 20 deg>36 deg  R ankle eversion: (20°): 4 deg>12 deg     Ankle MMT  R ankle dorsiflexion: (5/5): 4>4+/5  R ankle plantarflexion: (5/5): 4>4+/5  R ankle inversion: (5/5): 4>4+/5  R ankle eversio: (5/5): 4>4+/5    Outcome Measures:  Other Measures  Lower Extremity Funtional Score (LEFS): 47/80 >62/80    Treatments:  Therapeutic Exercise  Therapeutic Exercise Performed: Yes  Therapeutic Exercise: 24 minutes, 2 units   HEP and POC review  Recumbent bike x5' LEVEL 3.0 P  Slantboard 2x1'  Standing heel raises: x10, off 4 in step P  Small balance board: 2x1'    Fwd lunge at 6 in step to promote R ankle dorsiflexion x15 P    Below not performed  Step ups 6\" fwd 2x10; lat 2x10     SLS 3 x 30\", floor  (will progress to Airex pad)  Tandem stance 3 x 30\" ea lead   Lunge onto BOSU: 2x10 fwd  (2x10 lat - X, too painful for R knee)  R gastroc stretch with strap 3x30 second    BOSU strap IV/EV x10 5\" Hold   Ankle DF, INV, ER: blue band, 2x10  4 way ankle 2x10 R BOSU band  R foot doming 2x10 R   Seated heel/toe raise bilateral 2x15    Big toe extension 2x10 R   4 toe extension 2x10 R (P)  Towel scrunches R LE x2'     Manual Therapy:  STM/iASTM of R foot (A)    OP EDUCATION:   Reviewed:   Access Code: " [email or web address]  URL: [email or web address]  Date: 12/26/2023  Prepared by: Karen Michaud    Exercises  - Long Sitting Calf Stretch with Strap  - 1 x daily - 7 x weekly - 1 sets - 3 reps - 20-30 second hold  - Standard Lunge  - 1 x daily - 7 x weekly - 1 sets - 10 reps - 5 second hold  - Towel Scrunches  - 1 x daily - 7 x weekly - 1-2 sets - 10 reps  - Seated Heel Toe Raises  - 1 x daily - 7 x weekly - 1-2 sets - 10 reps      Goals:  Active       PT Problem       PT Goals       Start:  12/26/23    Expected End:  03/15/24       UPDATE: Improved score of LEFS >70 points for improved ease with ambulation/exercise-Week 4  Increase in LEFS score by 10 points to demonstrate improved functional mobility of B ankles for ease with standing and walking.-Week 4-MET  Improved gross R ankle, knee, hip musculature strength 5/5 MMT to increase stability/support with standing and ambulation at home and community.-Week 4-PARTIALLY MET  UPDATE: Decrease in max pain of R foot 0/10 with completion of ADLs/iADLs to improve QOL.-Week 4  Decrease in baseline pain of R foot 0/10 to demonstrate improved QOL-Week 4-MET  Improved R ankle AROM DF: 5 deg , PF: 65 deg, INV: 30 deg, EV: 10 deg painfree for improved ease with ADL/iADL completion in standing/weightbearing-Week 4-PARTIALLY MET  Patient will demonstrate compliance in their home exercise program in order to promote independence in self management of functional mobility.-Week 2-PARTIALLY MET  Patient Stated Goal: Less pain and to hopefully avoid surgery repair\"-PARTIALLY MET            "

## 2024-01-30 ENCOUNTER — LAB (OUTPATIENT)
Dept: LAB | Facility: LAB | Age: 29
End: 2024-01-30
Payer: MEDICARE

## 2024-01-30 ENCOUNTER — OFFICE VISIT (OUTPATIENT)
Dept: PRIMARY CARE | Facility: CLINIC | Age: 29
End: 2024-01-30
Payer: MEDICARE

## 2024-01-30 VITALS
SYSTOLIC BLOOD PRESSURE: 124 MMHG | WEIGHT: 293 LBS | BODY MASS INDEX: 50.02 KG/M2 | HEIGHT: 64 IN | HEART RATE: 62 BPM | DIASTOLIC BLOOD PRESSURE: 80 MMHG

## 2024-01-30 DIAGNOSIS — E55.9 VITAMIN D DEFICIENCY: ICD-10-CM

## 2024-01-30 DIAGNOSIS — Z00.00 MEDICARE ANNUAL WELLNESS VISIT, SUBSEQUENT: Primary | ICD-10-CM

## 2024-01-30 DIAGNOSIS — G40.909 NONINTRACTABLE EPILEPSY WITHOUT STATUS EPILEPTICUS, UNSPECIFIED EPILEPSY TYPE (MULTI): ICD-10-CM

## 2024-01-30 DIAGNOSIS — R74.8 ELEVATED ALKALINE PHOSPHATASE MEASUREMENT: ICD-10-CM

## 2024-01-30 DIAGNOSIS — F41.9 ANXIETY: ICD-10-CM

## 2024-01-30 DIAGNOSIS — K51.80 OTHER ULCERATIVE COLITIS WITHOUT COMPLICATION (MULTI): ICD-10-CM

## 2024-01-30 DIAGNOSIS — R73.03 PREDIABETES: ICD-10-CM

## 2024-01-30 DIAGNOSIS — E66.01 CLASS 3 SEVERE OBESITY DUE TO EXCESS CALORIES WITH SERIOUS COMORBIDITY AND BODY MASS INDEX (BMI) OF 50.0 TO 59.9 IN ADULT (MULTI): ICD-10-CM

## 2024-01-30 DIAGNOSIS — R79.89 ABNORMAL CBC MEASUREMENT: ICD-10-CM

## 2024-01-30 DIAGNOSIS — F34.1 PERSISTENT DEPRESSIVE DISORDER: ICD-10-CM

## 2024-01-30 DIAGNOSIS — G61.9 INFLAMMATORY NEUROPATHY (MULTI): ICD-10-CM

## 2024-01-30 PROBLEM — S86.819A STRAIN OF CALF MUSCLE: Status: RESOLVED | Noted: 2023-02-28 | Resolved: 2024-01-30

## 2024-01-30 LAB
25(OH)D3 SERPL-MCNC: 23 NG/ML (ref 30–100)
ALBUMIN SERPL BCP-MCNC: 4.1 G/DL (ref 3.4–5)
ALP SERPL-CCNC: 93 U/L (ref 33–110)
ALT SERPL W P-5'-P-CCNC: 23 U/L (ref 7–45)
ANION GAP SERPL CALC-SCNC: 11 MMOL/L (ref 10–20)
AST SERPL W P-5'-P-CCNC: 18 U/L (ref 9–39)
BASOPHILS # BLD AUTO: 0.03 X10*3/UL (ref 0–0.1)
BASOPHILS NFR BLD AUTO: 0.3 %
BILIRUB SERPL-MCNC: 0.3 MG/DL (ref 0–1.2)
BUN SERPL-MCNC: 12 MG/DL (ref 6–23)
CALCIUM SERPL-MCNC: 8.9 MG/DL (ref 8.6–10.3)
CHLORIDE SERPL-SCNC: 104 MMOL/L (ref 98–107)
CHOLEST SERPL-MCNC: 149 MG/DL (ref 0–199)
CHOLESTEROL/HDL RATIO: 3.9
CO2 SERPL-SCNC: 28 MMOL/L (ref 21–32)
CREAT SERPL-MCNC: 0.6 MG/DL (ref 0.5–1.05)
EGFRCR SERPLBLD CKD-EPI 2021: >90 ML/MIN/1.73M*2
EOSINOPHIL # BLD AUTO: 0.2 X10*3/UL (ref 0–0.7)
EOSINOPHIL NFR BLD AUTO: 2.1 %
ERYTHROCYTE [DISTWIDTH] IN BLOOD BY AUTOMATED COUNT: 13.3 % (ref 11.5–14.5)
EST. AVERAGE GLUCOSE BLD GHB EST-MCNC: 123 MG/DL
GLUCOSE SERPL-MCNC: 100 MG/DL (ref 74–99)
HBA1C MFR BLD: 5.9 %
HCT VFR BLD AUTO: 40.8 % (ref 36–46)
HDLC SERPL-MCNC: 38 MG/DL
HGB BLD-MCNC: 12.3 G/DL (ref 12–16)
IMM GRANULOCYTES # BLD AUTO: 0.05 X10*3/UL (ref 0–0.7)
IMM GRANULOCYTES NFR BLD AUTO: 0.5 % (ref 0–0.9)
LDLC SERPL CALC-MCNC: 96 MG/DL
LYMPHOCYTES # BLD AUTO: 2.56 X10*3/UL (ref 1.2–4.8)
LYMPHOCYTES NFR BLD AUTO: 27.1 %
MCH RBC QN AUTO: 26.2 PG (ref 26–34)
MCHC RBC AUTO-ENTMCNC: 30.1 G/DL (ref 32–36)
MCV RBC AUTO: 87 FL (ref 80–100)
MONOCYTES # BLD AUTO: 0.43 X10*3/UL (ref 0.1–1)
MONOCYTES NFR BLD AUTO: 4.6 %
NEUTROPHILS # BLD AUTO: 6.16 X10*3/UL (ref 1.2–7.7)
NEUTROPHILS NFR BLD AUTO: 65.4 %
NON HDL CHOLESTEROL: 111 MG/DL (ref 0–149)
NRBC BLD-RTO: 0 /100 WBCS (ref 0–0)
PLATELET # BLD AUTO: 442 X10*3/UL (ref 150–450)
POTASSIUM SERPL-SCNC: 4.8 MMOL/L (ref 3.5–5.3)
PROT SERPL-MCNC: 7 G/DL (ref 6.4–8.2)
RBC # BLD AUTO: 4.69 X10*6/UL (ref 4–5.2)
SODIUM SERPL-SCNC: 138 MMOL/L (ref 136–145)
TRIGL SERPL-MCNC: 75 MG/DL (ref 0–149)
VIT B12 SERPL-MCNC: 284 PG/ML (ref 211–911)
VLDL: 15 MG/DL (ref 0–40)
WBC # BLD AUTO: 9.4 X10*3/UL (ref 4.4–11.3)

## 2024-01-30 PROCEDURE — 83036 HEMOGLOBIN GLYCOSYLATED A1C: CPT

## 2024-01-30 PROCEDURE — 1123F ACP DISCUSS/DSCN MKR DOCD: CPT | Performed by: NURSE PRACTITIONER

## 2024-01-30 PROCEDURE — 36415 COLL VENOUS BLD VENIPUNCTURE: CPT

## 2024-01-30 PROCEDURE — 99214 OFFICE O/P EST MOD 30 MIN: CPT | Performed by: NURSE PRACTITIONER

## 2024-01-30 PROCEDURE — 85025 COMPLETE CBC W/AUTO DIFF WBC: CPT

## 2024-01-30 PROCEDURE — 82306 VITAMIN D 25 HYDROXY: CPT

## 2024-01-30 PROCEDURE — 80053 COMPREHEN METABOLIC PANEL: CPT

## 2024-01-30 PROCEDURE — 1036F TOBACCO NON-USER: CPT | Performed by: NURSE PRACTITIONER

## 2024-01-30 PROCEDURE — 80061 LIPID PANEL: CPT

## 2024-01-30 PROCEDURE — G0439 PPPS, SUBSEQ VISIT: HCPCS | Performed by: NURSE PRACTITIONER

## 2024-01-30 PROCEDURE — 3008F BODY MASS INDEX DOCD: CPT | Performed by: NURSE PRACTITIONER

## 2024-01-30 PROCEDURE — 82607 VITAMIN B-12: CPT

## 2024-01-30 ASSESSMENT — ENCOUNTER SYMPTOMS
VOMITING: 0
SHORTNESS OF BREATH: 0
BLOOD IN STOOL: 0
HEADACHES: 0
CHEST TIGHTNESS: 0
MYALGIAS: 0
ABDOMINAL PAIN: 0
LIGHT-HEADEDNESS: 0
CONSTIPATION: 0
COLOR CHANGE: 0
NAUSEA: 0
NERVOUS/ANXIOUS: 1
LOSS OF SENSATION IN FEET: 1
DYSURIA: 0
OCCASIONAL FEELINGS OF UNSTEADINESS: 0
FATIGUE: 0
DIARRHEA: 0
DIZZINESS: 0
PALPITATIONS: 0
ARTHRALGIAS: 0
DEPRESSION: 0

## 2024-01-30 ASSESSMENT — ACTIVITIES OF DAILY LIVING (ADL)
TAKING_MEDICATION: INDEPENDENT
DOING_HOUSEWORK: INDEPENDENT
GROCERY_SHOPPING: INDEPENDENT
MANAGING_FINANCES: INDEPENDENT
DRESSING: INDEPENDENT
BATHING: INDEPENDENT

## 2024-01-30 NOTE — PROGRESS NOTES
"Subjective   Reason for Visit: Geri Moss is an 29 y.o. female here for a Medicare Wellness visit.     Past Medical, Surgical, and Family History reviewed and updated in chart.         KT Nevarez returns for [facility] exam and follow up. No concerns today.     Epilepsy: Neurology: Dr. Sam[place]      Anxiety/depression: during well on current treatment.     Needs lab work    Patient Care Team:  RODRIGUEZ Barry as PCP - General (Family Medicine)  RODRIGUEZ Barry as PCP - [facility] Medicaid PCP  Maite Sam MD as Consulting Physician (Neurology)  Aakash Del Rosario MD as Consulting Physician (Obstetrics and Gynecology)     Review of Systems   Constitutional:  Negative for fatigue.   Respiratory:  Negative for chest tightness and shortness of breath.    Cardiovascular:  Negative for chest pain, palpitations and leg swelling.   Gastrointestinal:  Negative for abdominal pain, blood in stool, constipation, diarrhea, nausea and vomiting.   Genitourinary:  Negative for dysuria.   Musculoskeletal:  Positive for gait problem (d/t injury of R foot-in PT). Negative for arthralgias and myalgias.   Skin:  Negative for color change.   Neurological:  Negative for dizziness, light-headedness and headaches.   Psychiatric/Behavioral:  The patient is nervous/anxious (stable on medication).        Objective   Vitals:  /80   Pulse 62   Ht 1.626 m (5' 4\")   Wt (!) 153 kg (336 lb 4 oz)   BMI 57.72 kg/m²       Physical Exam  Vitals and nursing note reviewed.   Constitutional:       Appearance: Normal appearance.   Cardiovascular:      Rate and Rhythm: Normal rate and regular rhythm.      Heart sounds: Normal heart sounds.   Pulmonary:      Effort: Pulmonary effort is normal.      Breath sounds: Normal breath sounds.   Musculoskeletal:      Right lower le+ Edema present.      Left lower leg: No edema.   Skin:     General: Skin is warm and dry.   Neurological:      General: No focal deficit present.      " Mental Status: She is alert and oriented to person, place, and time.   Psychiatric:         Mood and Affect: Mood normal.         Behavior: Behavior normal.         Thought Content: Thought content normal.         Judgment: Judgment normal.         Assessment/Plan   Problem List Items Addressed This Visit       Vitamin D deficiency    Overview     1/31/23: Vitamin D Level: 9         Current Assessment & Plan     Vitamin D 50,000 units weekly  Will get labs today         Persistent depressive disorder    Current Assessment & Plan     Currently on Lexapro 20 mg daily.         Anxiety    Current Assessment & Plan     Currently on lexapro 20 mg daily.   Buspirone 5 mg TID as needed         Class 3 severe obesity due to excess calories with serious comorbidity and body mass index (BMI) of 50.0 to 59.9 in adult (CMS/[facility])    Current Assessment & Plan     Pt advised to institute a healthy, well-balanced meal with portion control and to exercise daily for at least 30-60 minutes.         Epilepsy, unspecified, not intractable, without status epilepticus (CMS/[facility])    Current Assessment & Plan     Managed by Neurology-Dr. 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

## 2024-01-31 DIAGNOSIS — E55.9 VITAMIN D DEFICIENCY: Primary | ICD-10-CM

## 2024-01-31 RX ORDER — CHOLECALCIFEROL (VITAMIN D3) 1250 MCG
50000 TABLET ORAL
Qty: 12 TABLET | Refills: 3 | Status: SHIPPED | OUTPATIENT
Start: 2024-01-31

## 2024-02-01 ENCOUNTER — TREATMENT (OUTPATIENT)
Dept: PHYSICAL THERAPY | Facility: CLINIC | Age: 29
End: 2024-02-01
Payer: MEDICARE

## 2024-02-01 DIAGNOSIS — M79.671 BILATERAL FOOT PAIN: ICD-10-CM

## 2024-02-01 DIAGNOSIS — Z87.828 HISTORY OF FOOT SPRAIN: ICD-10-CM

## 2024-02-01 DIAGNOSIS — M79.672 BILATERAL FOOT PAIN: ICD-10-CM

## 2024-02-01 PROCEDURE — 97140 MANUAL THERAPY 1/> REGIONS: CPT | Mod: GP,CQ

## 2024-02-01 PROCEDURE — 97110 THERAPEUTIC EXERCISES: CPT | Mod: GP,CQ

## 2024-02-01 ASSESSMENT — PAIN - FUNCTIONAL ASSESSMENT: PAIN_FUNCTIONAL_ASSESSMENT: 0-10

## 2024-02-01 ASSESSMENT — PAIN SCALES - GENERAL: PAINLEVEL_OUTOF10: 0 - NO PAIN

## 2024-02-01 NOTE — RESULT ENCOUNTER NOTE
Vitamin D level low- continue on vitamin D weekly  A1c increased to 5.9%, will continue to check every 6 months.

## 2024-02-01 NOTE — PROGRESS NOTES
"Physical Therapy Treatment    Patient Name: Geri Moss  MRN: [id number]  Today's Date: 2/1/2024  Time Calculation  Start Time: 0745  Stop Time: 0828  Time Calculation (min): 43 min    Assessment:   Patient identified by name and date of birth. Patient demonstrate moderate sway with balance activities for strengthening and required finger touch down to maintain balance. She demonstrated good tolerance with addition of STW with report of decreased tension after.     Plan:  OP PT Plan  Treatment/Interventions: Aquatic therapy, Cryotherapy, Education/ Instruction, Gait training, Manual therapy, Taping techniques, Therapeutic activities, Therapeutic exercises, Ultrasound (IASTM/cupping)  PT Plan: Skilled PT  PT Frequency: 2 times per week  Duration: 6 weeks for 12 additional visits in POC  Onset Date: 12/01/22  Certification Period Start Date: 12/26/23  Certification Period End Date: 03/25/24  Continue to progress with strengthening and manual as needed to increase with ease with ambulation with household chores.   Current Problem  Problem List Items Addressed This Visit             ICD-10-CM    Bilateral foot pain M79.671, M79.672    History of foot sprain Z87.828       Subjective Patient reported compliance with % of the time and verbalized understanding.  She reported she started an exercises routine with dancing and has not experienced any incased pain. Patient reported 0/10 pain and stated \" it feels looser\" after treatment.     General  General Comment: POC: 1/12  Precautions  Precautions  Precautions Comment: Seizure precautions; R LE WBAT; hx of migraines; Hx of R ACL repair 4/2023    Pain  Pain Assessment: 0-10  Pain Score: 0 - No pain     Treatments:  Therapeutic Exercise  Therapeutic Exercise Performed: Yes  Recumbent bike x5' LEVEL 3.0 P  Slantboard 2x1'  Step ups 6\" fwd 2x10; lat 2x10   Standing heel raises: x10, off 6 in step P  Small balance board: 2x1'    Fwd lunge at 6 in step to promote R " "ankle dorsiflexion x15   SLS 3 x 30\" Airex pad (P surface)  Tandem stance 3 x 30\" ea lead on airex (P suface)  Lunge onto BOSU: 2x10 fwd  (2x10 lat - X, too painful for R knee)    NOT this date;   R gastroc stretch with strap 3x30 second    BOSU strap IV/EV x10 5\" Hold   Ankle DF, INV, ER: blue band, 2x10  4 way ankle 2x10 R BOSU band  R foot doming 2x10 R   Seated heel/toe raise bilateral 2x15    Big toe extension 2x10 R   4 toe extension 2x10 R (P)  Towel scrunches R LE x2'     Manual Therapy  Manual Therapy Performed: Yes   STM to achilles and plantar of R foot (N)  OP EDUCATION:   Reviewed:   Access Code: [email or web address]  URL: [email or web address]  Date: 12/26/2023  Prepared by: Karen Michaud    Exercises  - Long Sitting Calf Stretch with Strap  - 1 x daily - 7 x weekly - 1 sets - 3 reps - 20-30 second hold  - Standard Lunge  - 1 x daily - 7 x weekly - 1 sets - 10 reps - 5 second hold  - Towel Scrunches  - 1 x daily - 7 x weekly - 1-2 sets - 10 reps  - Seated Heel Toe Raises  - 1 x daily - 7 x weekly - 1-2 sets - 10 reps      Goals:  Active       PT Problem       PT Goals (Progressing)       Start:  12/26/23    Expected End:  03/15/24       UPDATE: Improved score of LEFS >70 points for improved ease with ambulation/exercise-Week 4  Increase in LEFS score by 10 points to demonstrate improved functional mobility of B ankles for ease with standing and walking.-Week 4-MET  Improved gross R ankle, knee, hip musculature strength 5/5 MMT to increase stability/support with standing and ambulation at home and community.-Week 4-PARTIALLY MET  UPDATE: Decrease in max pain of R foot 0/10 with completion of ADLs/iADLs to improve QOL.-Week 4  Decrease in baseline pain of R foot 0/10 to demonstrate improved QOL-Week 4-MET  Improved R ankle AROM DF: 5 deg , PF: 65 deg, INV: 30 deg, EV: 10 deg painfree for improved ease with ADL/iADL completion in standing/weightbearing-Week 4-PARTIALLY MET  Patient will " "demonstrate compliance in their home exercise program in order to promote independence in self management of functional mobility.-Week 2-PARTIALLY MET  Patient Stated Goal: Less pain and to hopefully avoid surgery repair\"-PARTIALLY MET            "

## 2024-02-04 PROBLEM — R74.8 ELEVATED ALKALINE PHOSPHATASE MEASUREMENT: Status: RESOLVED | Noted: 2023-02-28 | Resolved: 2024-02-04

## 2024-02-04 PROBLEM — Z87.828: Status: RESOLVED | Noted: 2023-12-26 | Resolved: 2024-02-04

## 2024-02-05 ENCOUNTER — TREATMENT (OUTPATIENT)
Dept: PHYSICAL THERAPY | Facility: CLINIC | Age: 29
End: 2024-02-05
Payer: MEDICARE

## 2024-02-05 DIAGNOSIS — Z87.828 HISTORY OF FOOT SPRAIN: ICD-10-CM

## 2024-02-05 DIAGNOSIS — M79.671 BILATERAL FOOT PAIN: ICD-10-CM

## 2024-02-05 DIAGNOSIS — M79.672 BILATERAL FOOT PAIN: ICD-10-CM

## 2024-02-05 PROCEDURE — 97140 MANUAL THERAPY 1/> REGIONS: CPT | Mod: GP,CQ

## 2024-02-05 PROCEDURE — 97110 THERAPEUTIC EXERCISES: CPT | Mod: GP,CQ

## 2024-02-05 ASSESSMENT — PAIN - FUNCTIONAL ASSESSMENT: PAIN_FUNCTIONAL_ASSESSMENT: 0-10

## 2024-02-05 ASSESSMENT — PAIN SCALES - GENERAL: PAINLEVEL_OUTOF10: 0 - NO PAIN

## 2024-02-05 NOTE — PROGRESS NOTES
"Physical Therapy Treatment    Patient Name: Geri Moss  MRN: [id number]  Today's Date: 2/5/2024  Time Calculation  Start Time: 0830  Stop Time: 0910  Time Calculation (min): 40 min      Assessment:   Patient appropriately challenged. Patient ashleigh's tenderness with palpation along lateral aspect of R malleoli and achilles. Responds well to STM. Education provided for importance of stretching with patient ashleigh'ira good understanding.     Plan:  Continue to work on improving strength and ROM to be able to tolerate prolonged ambulation with little to no difficulty. -AB.     OP PT Plan  Treatment/Interventions: Aquatic therapy, Cryotherapy, Education/ Instruction, Gait training, Manual therapy, Taping techniques, Therapeutic activities, Therapeutic exercises, Ultrasound (IASTM/cupping)  PT Plan: Skilled PT  PT Frequency: 2 times per week  Duration: 6 weeks for 12 additional visits in POC  Onset Date: 12/01/22  Certification Period Start Date: 12/26/23  Certification Period End Date: 03/25/24    Current Problem  Problem List Items Addressed This Visit             ICD-10-CM    Bilateral foot pain M79.671, M79.672     Other Visit Diagnoses         Codes    History of foot sprain     Z87.828            Subjective   General  General Comment: POC: 2/12  Patient states that she's not having pain today. States that she is able to tolerate longer times with no pain in her R foot.    Precautions  Precautions  Precautions Comment: Seizure precautions; R LE WBAT; hx of migraines; Hx of R ACL repair 4/2023    Pain  Pain Assessment: 0-10  Pain Score: 0 - No pain      Treatments:  Therapeutic Exercise: 28 minutes, 2 units  Recumbent bike x5' LEVEL 3.0 P  Slantboard 2x1'  Step ups 6\" fwd 2x10; lat 2x10   Standing heel raises: x15, off 6 in step (P, reps)   Small balance board: 2x1'    Fwd lunge at 6 in step to promote R ankle dorsiflexion x15   SLS 3 x 30\" Airex pad   Tandem stance 3 x 30\" ea lead on airex (P suface)  Lunge onto " "BOSU: 2x10 fwd  (2x10 lat - X, too painful for R knee)    NOT this date;   R gastroc stretch with strap 3x30 second    BOSU strap IV/EV x10 5\" Hold   Ankle DF, INV, ER: blue band, 2x10  4 way ankle 2x10 R BOSU band  R foot doming 2x10 R   Seated heel/toe raise bilateral 2x15    Big toe extension 2x10 R   4 toe extension 2x10 R (P)  Towel scrunches R LE x2'     Manual Therapy: 10 minutes, 1 units  STM to achilles and plantar of R foot (N)    OP EDUCATION:   Reviewed:   Access Code: [email or web address]  URL: [email or web address]  Date: 12/26/2023  Prepared by: Karen Michaud    Exercises  - Long Sitting Calf Stretch with Strap  - 1 x daily - 7 x weekly - 1 sets - 3 reps - 20-30 second hold  - Standard Lunge  - 1 x daily - 7 x weekly - 1 sets - 10 reps - 5 second hold  - Towel Scrunches  - 1 x daily - 7 x weekly - 1-2 sets - 10 reps  - Seated Heel Toe Raises  - 1 x daily - 7 x weekly - 1-2 sets - 10 reps      Goals:  Active       PT Problem       PT Goals (Progressing)       Start:  12/26/23    Expected End:  03/15/24       UPDATE: Improved score of LEFS >70 points for improved ease with ambulation/exercise-Week 4  Increase in LEFS score by 10 points to demonstrate improved functional mobility of B ankles for ease with standing and walking.-Week 4-MET  Improved gross R ankle, knee, hip musculature strength 5/5 MMT to increase stability/support with standing and ambulation at home and community.-Week 4-PARTIALLY MET  UPDATE: Decrease in max pain of R foot 0/10 with completion of ADLs/iADLs to improve QOL.-Week 4  Decrease in baseline pain of R foot 0/10 to demonstrate improved QOL-Week 4-MET  Improved R ankle AROM DF: 5 deg , PF: 65 deg, INV: 30 deg, EV: 10 deg painfree for improved ease with ADL/iADL completion in standing/weightbearing-Week 4-PARTIALLY MET  Patient will demonstrate compliance in their home exercise program in order to promote independence in self management of functional mobility.-Week " "2-PARTIALLY MET  Patient Stated Goal: Less pain and to hopefully avoid surgery repair\"-PARTIALLY MET            "

## 2024-02-07 ENCOUNTER — TREATMENT (OUTPATIENT)
Dept: PHYSICAL THERAPY | Facility: CLINIC | Age: 29
End: 2024-02-07
Payer: MEDICARE

## 2024-02-07 DIAGNOSIS — M79.672 BILATERAL FOOT PAIN: ICD-10-CM

## 2024-02-07 DIAGNOSIS — M79.671 BILATERAL FOOT PAIN: ICD-10-CM

## 2024-02-07 DIAGNOSIS — Z87.828 HISTORY OF FOOT SPRAIN: ICD-10-CM

## 2024-02-07 PROCEDURE — 97110 THERAPEUTIC EXERCISES: CPT | Mod: GP,CQ

## 2024-02-07 ASSESSMENT — PAIN - FUNCTIONAL ASSESSMENT: PAIN_FUNCTIONAL_ASSESSMENT: 0-10

## 2024-02-07 ASSESSMENT — PAIN SCALES - GENERAL: PAINLEVEL_OUTOF10: 0 - NO PAIN

## 2024-02-07 NOTE — PROGRESS NOTES
"Physical Therapy Treatment    Patient Name: Geri Moss  MRN: [id number]  Today's Date: 2024  Time Calculation  Start Time: 748  Stop Time: 828  Time Calculation (min): 40 min      Assessment:   Patient identified with name and .   Good response and tolerance to treatment with patient indicating pain-free status throughout. She voices compliance with current HEP and demonstrates goal progression.    Plan:  Will continue therex to improve functional strength and stability and reduce difficulty with community ambulation as well as prolonged standing.     OP PT Plan  Treatment/Interventions: Aquatic therapy, Cryotherapy, Education/ Instruction, Gait training, Manual therapy, Taping techniques, Therapeutic activities, Therapeutic exercises, Ultrasound (IASTM/cupping)  PT Plan: Skilled PT  PT Frequency: 2 times per week  Duration: 6 weeks for 12 additional visits in POC  Onset Date: 22  Certification Period Start Date: 23  Certification Period End Date: 24    Current Problem  Problem List Items Addressed This Visit             ICD-10-CM    Bilateral foot pain M79.671, M79.672     Other Visit Diagnoses         Codes    History of foot sprain     Z87.828            Subjective   Knee is a little sore this morning but the ankle and foot seem to be doing well.     Precautions  Precautions  Precautions Comment: Seizure precautions; R LE WBAT; hx of migraines; Hx of R ACL repair 2023    Pain  Pain Assessment: 0-10  Pain Score: 0 - No pain    Treatments:  Therapeutic Exercise  Therapeutic Exercise Performed: Yes  Therapeutic Exercise: 38 minutes, 3 units   Recumbent bike x5' LEVEL 3.0   Slantboard 2x1'  Step ups 6\" fwd 2x10; lat 2x10   Standing heel raises: x15, off 6 in step    Small balance board: 2x1'    Fwd lunge at 6 in step to promote R ankle dorsiflexion x15   SLS 3 x 30\" Airex pad   Tandem stance 3 x 30\" ea lead on airex   Lunge onto BOSU: 2x10 fwd  (2x10 lat - X, too painful for R " "knee)  R foot doming 2x10 R   Seated heel/toe raise bilateral 2x15       NOT this date;   R gastroc stretch with strap 3x30 second    BOSU strap IV/EV x10 5\" Hold   Ankle DF, INV, ER: blue band, 2x10  4 way ankle 2x10 R BOSU band  Big toe extension 2x10 R   4 toe extension 2x10 R (P)  Towel scrunches R LE x2'     OP EDUCATION:    Reviewed:   Access Code: [email or web address]  URL: [email or web address]  Date: 12/26/2023  Prepared by: Karen Michaud    Exercises  - Long Sitting Calf Stretch with Strap  - 1 x daily - 7 x weekly - 1 sets - 3 reps - 20-30 second hold  - Standard Lunge  - 1 x daily - 7 x weekly - 1 sets - 10 reps - 5 second hold  - Towel Scrunches  - 1 x daily - 7 x weekly - 1-2 sets - 10 reps  - Seated Heel Toe Raises  - 1 x daily - 7 x weekly - 1-2 sets - 10 reps      Goals:  Active       PT Problem       PT Goals (Progressing)       Start:  12/26/23    Expected End:  03/15/24       UPDATE: Improved score of LEFS >70 points for improved ease with ambulation/exercise-Week 4  Increase in LEFS score by 10 points to demonstrate improved functional mobility of B ankles for ease with standing and walking.-Week 4-MET  Improved gross R ankle, knee, hip musculature strength 5/5 MMT to increase stability/support with standing and ambulation at home and community.-Week 4-PARTIALLY MET  UPDATE: Decrease in max pain of R foot 0/10 with completion of ADLs/iADLs to improve QOL.-Week 4  Decrease in baseline pain of R foot 0/10 to demonstrate improved QOL-Week 4-MET  Improved R ankle AROM DF: 5 deg , PF: 65 deg, INV: 30 deg, EV: 10 deg painfree for improved ease with ADL/iADL completion in standing/weightbearing-Week 4-PARTIALLY MET  Patient will demonstrate compliance in their home exercise program in order to promote independence in self management of functional mobility.-Week 2-PARTIALLY MET  Patient Stated Goal: Less pain and to hopefully avoid surgery repair\"-PARTIALLY MET            "

## 2024-02-12 ENCOUNTER — TREATMENT (OUTPATIENT)
Dept: PHYSICAL THERAPY | Facility: CLINIC | Age: 29
End: 2024-02-12
Payer: MEDICARE

## 2024-02-12 DIAGNOSIS — Z87.828 HISTORY OF FOOT SPRAIN: ICD-10-CM

## 2024-02-12 DIAGNOSIS — M79.671 BILATERAL FOOT PAIN: ICD-10-CM

## 2024-02-12 DIAGNOSIS — M79.672 BILATERAL FOOT PAIN: ICD-10-CM

## 2024-02-12 PROCEDURE — 97110 THERAPEUTIC EXERCISES: CPT | Mod: GP,CQ

## 2024-02-12 PROCEDURE — 97140 MANUAL THERAPY 1/> REGIONS: CPT | Mod: GP,CQ

## 2024-02-12 ASSESSMENT — PAIN SCALES - GENERAL: PAINLEVEL_OUTOF10: 2

## 2024-02-12 ASSESSMENT — PAIN - FUNCTIONAL ASSESSMENT: PAIN_FUNCTIONAL_ASSESSMENT: 0-10

## 2024-02-12 NOTE — PROGRESS NOTES
"Physical Therapy Treatment    Patient Name: Geri Moss  MRN: [id number]  Today's Date: 2/12/2024  Time Calculation  Start Time: 0745  Stop Time: 0828  Time Calculation (min): 43 min  Manual Therapy: 13 mins  Therapeutic Exerrcises 28mins   Visit 4/12      Assessment: Patient identified by name and date of birth. Used manual therapy this treatment due to complaints of pain and tightness in the RLE. R toe extension demonstrated weakness during therapeutic exercises. Patient stated her pain was decreased at the end of treatment.        Plan:  Will continue manual therapy as needed to reduce tightness and pain and incorporate therapeutic exercises to increase functional ankle strength to improve QOL. Michael Wesley, SPTA      OP PT Plan  Treatment/Interventions:  (IASTM/cupping)    Current Problem  Problem List Items Addressed This Visit             ICD-10-CM       Musculoskeletal and Injuries    Bilateral foot pain M79.671, M79.672     Other Visit Diagnoses         Codes    History of foot sprain     Z87.828            Subjective   Patient states she is having R knee and ankle soreness from twisting her leg dancing yesterday night. Reports pain worsens during walking.     Precautions       Pain  Pain Assessment: 0-10  Pain Score: 2  Pain Type: Chronic pain  Pain Location: Foot    Treatments:     Recumbent bike x5' LEVEL 3.0   Slantboard 2x1'  Seated Toe raises 10x   Seated Alternate big toe/ 4 toe extensions   Seated DF 20x   Step ups 6\" fwd 2x10; lat 2x10   Standing heel raises: x15, off 6 in step  (X)  Small balance board Taps FWD/ Lateral  10x   Fwd lunge at 6 in step to promote R ankle dorsiflexion x15   SLS 2 x 30\" Airex pad bilateral   Tandem stance 3 x 30\" ea lead on airex (X)  Lunge onto BOSU: 2x10 fwd  (2x10 lat - X, too painful for R knee)  R foot doming 2x10 R (X)  Standing heel/toe raise bilateral 2x15       NOT this date;   R gastroc stretch with strap 3x30 second    BOSU strap IV/EV x10 5\" Hold " "  Ankle DF, INV, ER: blue band, 2x10  4 way ankle 2x10 R BOSU band  Towel scrunches R LE x2'     IASTM brush, stroke, Jhook, to achilles, gastrocnemius, tibialis anterior Bevel 0-30 degrees   Massage to extensor digitorum and extensor hallicus   OP EDUCATION:    Reviewed:   Access Code: [email or web address]  URL: [email or web address]  Date: 12/26/2023  Prepared by: Karen Michaud    Exercises  - Long Sitting Calf Stretch with Strap  - 1 x daily - 7 x weekly - 1 sets - 3 reps - 20-30 second hold  - Standard Lunge  - 1 x daily - 7 x weekly - 1 sets - 10 reps - 5 second hold  - Towel Scrunches  - 1 x daily - 7 x weekly - 1-2 sets - 10 reps  - Seated Heel Toe Raises  - 1 x daily - 7 x weekly - 1-2 sets - 10 reps      Goals:  Active       PT Problem       PT Goals (Progressing)       Start:  12/26/23    Expected End:  03/15/24       UPDATE: Improved score of LEFS >70 points for improved ease with ambulation/exercise-Week 4  Increase in LEFS score by 10 points to demonstrate improved functional mobility of B ankles for ease with standing and walking.-Week 4-MET  Improved gross R ankle, knee, hip musculature strength 5/5 MMT to increase stability/support with standing and ambulation at home and community.-Week 4-PARTIALLY MET  UPDATE: Decrease in max pain of R foot 0/10 with completion of ADLs/iADLs to improve QOL.-Week 4  Decrease in baseline pain of R foot 0/10 to demonstrate improved QOL-Week 4-MET  Improved R ankle AROM DF: 5 deg , PF: 65 deg, INV: 30 deg, EV: 10 deg painfree for improved ease with ADL/iADL completion in standing/weightbearing-Week 4-PARTIALLY MET  Patient will demonstrate compliance in their home exercise program in order to promote independence in self management of functional mobility.-Week 2-PARTIALLY MET  Patient Stated Goal: Less pain and to hopefully avoid surgery repair\"-PARTIALLY MET            "

## 2024-02-14 ENCOUNTER — TREATMENT (OUTPATIENT)
Dept: PHYSICAL THERAPY | Facility: CLINIC | Age: 29
End: 2024-02-14
Payer: MEDICARE

## 2024-02-14 DIAGNOSIS — Z87.828 HISTORY OF FOOT SPRAIN: ICD-10-CM

## 2024-02-14 DIAGNOSIS — M79.672 BILATERAL FOOT PAIN: ICD-10-CM

## 2024-02-14 DIAGNOSIS — M79.671 BILATERAL FOOT PAIN: ICD-10-CM

## 2024-02-14 PROCEDURE — 97140 MANUAL THERAPY 1/> REGIONS: CPT | Mod: GP,CQ,KX

## 2024-02-14 PROCEDURE — 97110 THERAPEUTIC EXERCISES: CPT | Mod: GP,CQ,KX

## 2024-02-14 ASSESSMENT — PAIN - FUNCTIONAL ASSESSMENT: PAIN_FUNCTIONAL_ASSESSMENT: 0-10

## 2024-02-14 ASSESSMENT — PAIN SCALES - GENERAL: PAINLEVEL_OUTOF10: 0 - NO PAIN

## 2024-02-14 NOTE — PROGRESS NOTES
Physical Therapy Treatment    Patient Name: Geri Moss  MRN: [id number]  Today's Date: 2/14/2024  Time Calculation  Start Time: 0830  Stop Time: 0917  Time Calculation (min): 47 min  Manual Therapy: 13 mins  Therapeutic Exerrcises 28mins   Visit 5/12      Assessment: Patient identified by name and date of birth. Continued manual techniques this treatment due to calf tightness. Patient stated she was having no pain during ankle strengthening and stabilization exercise in any planes of motion. Was able to reincorporate bosu lunges due to having no pain. LE strength displayed weakness during the concentric portion of this exercise.        Plan:   Will continue manual therapy as needed to reduce tightness and pain and incorporate therapeutic exercises to increase functional ankle strength to increase ADL endurance without increased pain. ELIZABETH Davis  All treatment and clinical decision making directly supervised by Janell Meza PTA [id number].      OP PT Plan  Treatment/Interventions: Aquatic therapy, Cryotherapy, Education/ Instruction, Gait training, Manual therapy, Taping techniques, Therapeutic activities, Therapeutic exercises, Ultrasound (IASTM/cupping)  PT Plan: Skilled PT  PT Frequency: 2 times per week  Duration: 6 weeks for 12 additional visits in POC  Onset Date: 12/01/22  Certification Period Start Date: 12/26/23  Certification Period End Date: 03/25/24    Current Problem  Problem List Items Addressed This Visit             ICD-10-CM       Musculoskeletal and Injuries    Bilateral foot pain M79.671, M79.672     Other Visit Diagnoses         Codes    History of foot sprain     Z87.828            Subjective   Patient states her foot and knee pain are doing better today. Reports she is only experiencing pain during inversion. States she is still experiencing some tightness in the calf.     Precautions Seizure precautions; R LE WBAT; hx of migraines; Hx of R ACL repair 4/2023        Pain  Pain  "Assessment: 0-10  Pain Score: 0 - No pain  Pain Location: Foot    Treatments:     Recumbent bike x5' LEVEL 3.0   Slantboard 2x1'  Seated Toe raises 10x   Seated Alternate big toe/ 4 toe extensions 10x  Towel inversion/ eversion 15x (N)  Towel DF slides 10x (N)  Deficit DF/PF on airex 15x (N)  Seated DF 20x   Step ups 6\" fwd 2x10; lat 2x10 (FWD only)  Standing heel raises: x15, off 6 in step  (X)  Small balance board Taps FWD/ Lateral  20x   Fwd lunge at 6 in step to promote R ankle dorsiflexion x15   SLS 2 x 30\" Airex pad bilateral   Tandem stance 3 x 30\" ea lead on airex (X)  Lunge onto BOSU: 2x10 fwd (P able to complete without pain)  R foot doming 2x10 R (X)  Standing heel/toe raise bilateral 2x15       NOT this date;   R gastroc stretch with strap 3x30 second    BOSU strap IV/EV x10 5\" Hold   Ankle DF, INV, ER: blue band, 2x10  4 way ankle 2x10 R BOSU band  Towel scrunches R LE x2'     IASTM brush, stroke, Jhook, to achilles, gastrocnemius, tibialis anterior Bevel 0-30 degrees   Massage to extensor digitorum and extensor hallicus   OP EDUCATION:    Reviewed:   Access Code: [email or web address]  URL: [email or web address]  Date: 12/26/2023  Prepared by: Karen Michaud    Exercises  - Long Sitting Calf Stretch with Strap  - 1 x daily - 7 x weekly - 1 sets - 3 reps - 20-30 second hold  - Standard Lunge  - 1 x daily - 7 x weekly - 1 sets - 10 reps - 5 second hold  - Towel Scrunches  - 1 x daily - 7 x weekly - 1-2 sets - 10 reps  - Seated Heel Toe Raises  - 1 x daily - 7 x weekly - 1-2 sets - 10 reps      Goals:  Active       PT Problem       PT Goals (Progressing)       Start:  12/26/23    Expected End:  03/15/24       UPDATE: Improved score of LEFS >70 points for improved ease with ambulation/exercise-Week 4  Increase in LEFS score by 10 points to demonstrate improved functional mobility of B ankles for ease with standing and walking.-Week 4-MET  Improved gross R ankle, knee, hip musculature strength " "5/5 MMT to increase stability/support with standing and ambulation at home and community.-Week 4-PARTIALLY MET  UPDATE: Decrease in max pain of R foot 0/10 with completion of ADLs/iADLs to improve QOL.-Week 4  Decrease in baseline pain of R foot 0/10 to demonstrate improved QOL-Week 4-MET  Improved R ankle AROM DF: 5 deg , PF: 65 deg, INV: 30 deg, EV: 10 deg painfree for improved ease with ADL/iADL completion in standing/weightbearing-Week 4-PARTIALLY MET  Patient will demonstrate compliance in their home exercise program in order to promote independence in self management of functional mobility.-Week 2-PARTIALLY MET  Patient Stated Goal: Less pain and to hopefully avoid surgery repair\"-PARTIALLY MET            "

## 2024-02-19 ENCOUNTER — TREATMENT (OUTPATIENT)
Dept: PHYSICAL THERAPY | Facility: CLINIC | Age: 29
End: 2024-02-19
Payer: MEDICARE

## 2024-02-19 DIAGNOSIS — Z87.828 HISTORY OF FOOT SPRAIN: ICD-10-CM

## 2024-02-19 DIAGNOSIS — M79.671 BILATERAL FOOT PAIN: ICD-10-CM

## 2024-02-19 DIAGNOSIS — M79.672 BILATERAL FOOT PAIN: ICD-10-CM

## 2024-02-19 PROCEDURE — 97140 MANUAL THERAPY 1/> REGIONS: CPT | Mod: GP,CQ,KX

## 2024-02-19 PROCEDURE — 97110 THERAPEUTIC EXERCISES: CPT | Mod: GP,CQ,KX

## 2024-02-19 ASSESSMENT — PAIN SCALES - GENERAL: PAINLEVEL_OUTOF10: 2

## 2024-02-19 ASSESSMENT — PAIN - FUNCTIONAL ASSESSMENT: PAIN_FUNCTIONAL_ASSESSMENT: 0-10

## 2024-02-19 NOTE — PROGRESS NOTES
Physical Therapy Treatment    Patient Name: Geri Moss  MRN: [id number]  Today's Date: 2/19/2024  Time Calculation  Start Time: 0745  Stop Time: 0830  Time Calculation (min): 45 min  Manual Therapy: 15 mins  Therapeutic Exerrcises 28mins   Visit 6/12      Assessment: Patient identified by name and date of birth. Used manual techniques this treatment to decrease pain and tightness in the R calf and plantar fascia. Patient stated manual techniques helped decrease pain. Stated no increase in foot/ ankle pain with any ankle stabilization exercises. Continues to have knee discomfort during bosu lunges, modified reps due to this. Educated patient on continuing SLS exercises safely at home, patient displayed good understanding.        Plan:   Will continue manual therapy as needed to reduce tightness and pain and incorporate therapeutic exercises to increase functional ankle stability. ELIZABETH Davis All treatment and clinical decision making directly supervised by Janell Meza PTA [id number].        OP PT Plan  Treatment/Interventions: Aquatic therapy, Cryotherapy, Education/ Instruction, Gait training, Manual therapy, Taping techniques, Therapeutic activities, Therapeutic exercises, Ultrasound (IASTM/cupping)  PT Plan: Skilled PT  PT Frequency: 2 times per week  Duration: 6 weeks for 12 additional visits in POC  Onset Date: 12/01/22  Certification Period Start Date: 12/26/23  Certification Period End Date: 03/25/24    Current Problem  Problem List Items Addressed This Visit             ICD-10-CM       Musculoskeletal and Injuries    Bilateral foot pain M79.671, M79.672     Other Visit Diagnoses         Codes    History of foot sprain     Z87.828            Subjective  Patient stated she is experiencing pain from bowling over the weekend. Reports pain increase happened when unloading the R foot.       Precautions Seizure precautions; R LE WBAT; hx of migraines; Hx of R ACL repair 4/2023        Pain  Pain Assessment:  "0-10  Pain Score: 2  Pain Location: Foot    Treatments:   Therapeutic Exercise  Recumbent bike x5' LEVEL 3.0   Slantboard 2x1'  R gastroc stretch with strap 10x5'' second    Seated Alternate big toe/ 4 toe extensions 10x  Inv/ Eversion towel Squeezes 10x3\" (N)  Towel DF slides 10x   Deficit DF/PF on airex 15x (X)  Heel/toe raises on airex 20x   Small balance board Taps FWD/ Lateral  20x   Small balance board FWD/ Lateral 2x30'' (N)  Step ups 6\" fwd 2x10; lat 2x10 (FWD only) (X)  Standing heel raises: x15, off 6 in step  (X)  Fwd lunge at 6 in step to promote R ankle dorsiflexion x15 (X)  SLS 2 x 30\" Airex pad R only  Lunge onto BOSU: x10 fwd (Slight knee discomfort)    R foot doming 2x10 R (X)     NOT this date;   Standing heel/toe raise bilateral 2x15    Tandem stance 3 x 30\" ea lead on airex (X)  BOSU strap IV/EV x10 5\" Hold   Ankle DF, INV, ER: blue band, 2x10  4 way ankle 2x10 R BOSU band  Towel scrunches R LE x2'     Manual Therapy  IASTM brush, stroke, Jhook, to achilles, gastrocnemius, tibialis anterior, plantar fascia (N to fascia) Bevel 0-30 degrees   Massage to extensor digitorum and extensor hallicus   OP EDUCATION:    Reviewed:   Access Code: [email or web address]  URL: [email or web address]  Date: 12/26/2023  Prepared by: Karen Michaud    Exercises  - Long Sitting Calf Stretch with Strap  - 1 x daily - 7 x weekly - 1 sets - 3 reps - 20-30 second hold  - Standard Lunge  - 1 x daily - 7 x weekly - 1 sets - 10 reps - 5 second hold  - Towel Scrunches  - 1 x daily - 7 x weekly - 1-2 sets - 10 reps  - Seated Heel Toe Raises  - 1 x daily - 7 x weekly - 1-2 sets - 10 reps      Goals:  Active       PT Problem       PT Goals (Progressing)       Start:  12/26/23    Expected End:  03/15/24       UPDATE: Improved score of LEFS >70 points for improved ease with ambulation/exercise-Week 4  Increase in LEFS score by 10 points to demonstrate improved functional mobility of B ankles for ease with standing " "and walking.-Week 4-MET  Improved gross R ankle, knee, hip musculature strength 5/5 MMT to increase stability/support with standing and ambulation at home and community.-Week 4-PARTIALLY MET  UPDATE: Decrease in max pain of R foot 0/10 with completion of ADLs/iADLs to improve QOL.-Week 4  Decrease in baseline pain of R foot 0/10 to demonstrate improved QOL-Week 4-MET  Improved R ankle AROM DF: 5 deg , PF: 65 deg, INV: 30 deg, EV: 10 deg painfree for improved ease with ADL/iADL completion in standing/weightbearing-Week 4-PARTIALLY MET  Patient will demonstrate compliance in their home exercise program in order to promote independence in self management of functional mobility.-Week 2-PARTIALLY MET  Patient Stated Goal: Less pain and to hopefully avoid surgery repair\"-PARTIALLY MET            "

## 2024-02-21 ENCOUNTER — TREATMENT (OUTPATIENT)
Dept: PHYSICAL THERAPY | Facility: CLINIC | Age: 29
End: 2024-02-21
Payer: MEDICARE

## 2024-02-21 DIAGNOSIS — M79.672 BILATERAL FOOT PAIN: ICD-10-CM

## 2024-02-21 DIAGNOSIS — Z87.828 HISTORY OF FOOT SPRAIN: ICD-10-CM

## 2024-02-21 DIAGNOSIS — M79.671 BILATERAL FOOT PAIN: ICD-10-CM

## 2024-02-21 PROCEDURE — 97110 THERAPEUTIC EXERCISES: CPT | Mod: GP,CQ,KX

## 2024-02-21 PROCEDURE — 97140 MANUAL THERAPY 1/> REGIONS: CPT | Mod: GP,CQ,KX

## 2024-02-21 ASSESSMENT — PAIN - FUNCTIONAL ASSESSMENT: PAIN_FUNCTIONAL_ASSESSMENT: 0-10

## 2024-02-21 ASSESSMENT — PAIN SCALES - GENERAL: PAINLEVEL_OUTOF10: 0 - NO PAIN

## 2024-02-21 NOTE — PROGRESS NOTES
Physical Therapy Treatment    Patient Name: Geri Moss  MRN: [id number]  Today's Date: 2/21/2024  Time Calculation  Start Time: 0746  Stop Time: 0830  Time Calculation (min): 44 min  Manual Therapy: 15 mins  Therapeutic Exerrcises 28mins   Visit 8/12      Assessment: Patient identified by name and date of birth. Used cupping and IASTM due to tightness in the plantar fascia and achilles. Patient stated she was experiencing a burning sensation on the bottom of the foot during eccentric calf raises. Patient was able to tolerate airex lunges without experiencing discomfort in the knee or ankle. Educated patient on the use of heat if foot is sore from manual techniques.        Plan:   Will continue manual therapy as needed to reduce soft tissue restriction and continue ankle stabilization to increase ADL endurance without complaints of pain increase. ELIZABETH Davis All treatment and clinical decision making directly supervised by Janell Meza PTA [id number].          OP PT Plan  Treatment/Interventions: Aquatic therapy, Cryotherapy, Education/ Instruction, Gait training, Manual therapy, Taping techniques, Therapeutic activities, Therapeutic exercises, Ultrasound (IASTM/cupping)  PT Plan: Skilled PT  PT Frequency: 2 times per week  Duration: 6 weeks for 12 additional visits in POC  Onset Date: 12/01/22  Certification Period Start Date: 12/26/23  Certification Period End Date: 03/25/24    Current Problem  Problem List Items Addressed This Visit             ICD-10-CM       Musculoskeletal and Injuries    Bilateral foot pain M79.671, M79.672     Other Visit Diagnoses         Codes    History of foot sprain     Z87.828            Subjective  Patient states she experienced a little pain yesterday, but is not experiencing any pain today.       Precautions Seizure precautions; R LE WBAT; hx of migraines; Hx of R ACL repair 4/2023        Pain  Pain Assessment: 0-10  Pain Score: 0 - No pain  Pain Location: Ankle  Pain  "Orientation: Right    Treatments:   Therapeutic Exercise  Recumbent bike x5' LEVEL 3.0   R gastroc stretch with strap 10x5'' second    Seated Alternate big toe/ 4 toe extensions 10x  Inv/ Eversion towel Squeezes 10x  Baps board 10x CW/ CCW (N)  Towel DF slides 10x3\"   Calf raise eccentric only 10x (N slight discomfort)  Deficit DF/PF on airex 15x (X)  Heel/toe raises on airex 20x   Small balance board Taps FWD/ Lateral  20x   Small balance board FWD/ Lateral 2x30'' (N)  Step ups 6\" fwd 2x10; lat 2x10 (FWD only) (X)  Standing heel raises: x15, off 6 in step  (X)  Fwd lunge at 6 in step to promote R ankle dorsiflexion x15 (X)  SLS 2 x 30\" Airex pad R only  Lunge on airex 2x10 bilat (N)  Lunge onto BOSU: x10 fwd (X)    Slantboard 2x1'  R foot doming 2x10 R (X)     NOT this date;   Standing heel/toe raise bilateral 2x15    Tandem stance 3 x 30\" ea lead on airex (X)  BOSU strap IV/EV x10 5\" Hold   Ankle DF, INV, ER: blue band, 2x10  4 way ankle 2x10 R BOSU band  Towel scrunches R LE x2'     Manual Therapy  IASTM brush, stroke, Jhook, to achilles, gastrocnemius, tibialis anterior, plantar fascia  Bevel 0-30 degrees   Massage to extensor digitorum and extensor hallicus   Static and dynamic upping to Plantar fascia (N)  OP EDUCATION:    Reviewed:   Access Code: [email or web address]  URL: [email or web address]  Date: 12/26/2023  Prepared by: Karen Michaud    Exercises  - Long Sitting Calf Stretch with Strap  - 1 x daily - 7 x weekly - 1 sets - 3 reps - 20-30 second hold  - Standard Lunge  - 1 x daily - 7 x weekly - 1 sets - 10 reps - 5 second hold  - Towel Scrunches  - 1 x daily - 7 x weekly - 1-2 sets - 10 reps  - Seated Heel Toe Raises  - 1 x daily - 7 x weekly - 1-2 sets - 10 reps      Goals:  Active       PT Problem       PT Goals (Progressing)       Start:  12/26/23    Expected End:  03/15/24       UPDATE: Improved score of LEFS >70 points for improved ease with ambulation/exercise-Week 4  Increase in " "LEFS score by 10 points to demonstrate improved functional mobility of B ankles for ease with standing and walking.-Week 4-MET  Improved gross R ankle, knee, hip musculature strength 5/5 MMT to increase stability/support with standing and ambulation at home and community.-Week 4-PARTIALLY MET  UPDATE: Decrease in max pain of R foot 0/10 with completion of ADLs/iADLs to improve QOL.-Week 4  Decrease in baseline pain of R foot 0/10 to demonstrate improved QOL-Week 4-MET  Improved R ankle AROM DF: 5 deg , PF: 65 deg, INV: 30 deg, EV: 10 deg painfree for improved ease with ADL/iADL completion in standing/weightbearing-Week 4-PARTIALLY MET  Patient will demonstrate compliance in their home exercise program in order to promote independence in self management of functional mobility.-Week 2-PARTIALLY MET  Patient Stated Goal: Less pain and to hopefully avoid surgery repair\"-PARTIALLY MET            "

## 2024-02-26 ENCOUNTER — TREATMENT (OUTPATIENT)
Dept: PHYSICAL THERAPY | Facility: CLINIC | Age: 29
End: 2024-02-26
Payer: MEDICARE

## 2024-02-26 ENCOUNTER — APPOINTMENT (OUTPATIENT)
Dept: PHYSICAL THERAPY | Facility: CLINIC | Age: 29
End: 2024-02-26
Payer: MEDICARE

## 2024-02-26 DIAGNOSIS — M79.672 BILATERAL FOOT PAIN: ICD-10-CM

## 2024-02-26 DIAGNOSIS — Z87.828 HISTORY OF FOOT SPRAIN: ICD-10-CM

## 2024-02-26 DIAGNOSIS — M79.671 BILATERAL FOOT PAIN: ICD-10-CM

## 2024-02-26 PROCEDURE — 97110 THERAPEUTIC EXERCISES: CPT | Mod: GP,KX

## 2024-02-26 PROCEDURE — 97140 MANUAL THERAPY 1/> REGIONS: CPT | Mod: GP,KX

## 2024-02-26 ASSESSMENT — PAIN SCALES - GENERAL: PAINLEVEL_OUTOF10: 2

## 2024-02-26 ASSESSMENT — PAIN - FUNCTIONAL ASSESSMENT: PAIN_FUNCTIONAL_ASSESSMENT: 0-10

## 2024-02-26 NOTE — PROGRESS NOTES
Physical Therapy Treatment    Patient Name: Geri Moss  MRN: [id number]  Today's Date: 2/26/2024  Time Calculation  Start Time: 0901  Stop Time: 0944  Time Calculation (min): 43 min  Manual Therapy: 18 mins  Therapeutic Exercises 23mins   Visit 9/12      Assessment:    Patient seen for 30 day reassessment this date. She continues to demonstrate deficits in R ankle ROM especially of achilles tendon. Fatigue of R plantarflexors with eccentric control exercise during heel raises. Increased restriction/tenderness of R dorsum of foot as well as at achilles insertion. Some improvement in mobility following manual therapy techniques. Patient to continue with 3 remaining sessions in POC and follow-up with referring provider in regards to impairments. Reassessment after 3 additional visits with patient verbalizing agreement.    Plan:   Plan to continue with manual therapy to address restriction of R achilles/plantar fascia mobility as well as progress with B LE strengthening/stability for ease with prolonged ambulation and standing to reduce pain.    OP PT Plan  Treatment/Interventions: Aquatic therapy, Cryotherapy, Education/ Instruction, Gait training, Manual therapy, Taping techniques, Therapeutic activities, Therapeutic exercises, Ultrasound (IASTM/cupping)  PT Plan: Skilled PT  PT Frequency: 2 times per week  Duration: 6 weeks for 12 additional visits in POC  Onset Date: 12/01/22  Certification Period Start Date: 12/26/23  Certification Period End Date: 03/25/24    Current Problem  Problem List Items Addressed This Visit             ICD-10-CM    Bilateral foot pain M79.671, M79.672    History of foot sprain Z87.828         Subjective  Patient says that she has minor pain today at 2/10. Spent the weekend with her son. She has a follow-up with referring provider later this week. She says that she can definitely perform things for longer periods of time when standing on her feet vs prior to PT  "interventions.    Precautions  Precautions  Precautions Comment: Seizure precautions; R LE WBAT; hx of migraines; Hx of R ACL repair 4/2023    Pain  Pain Assessment: 0-10  Pain Score: 2  Pain Location: Ankle  Pain Orientation: Right    Treatments:   Therapeutic Exercise  Recumbent bike x5' LEVEL 3.0   Slantboard gastroc stretch 2x1 min  Calf raise eccentric only 10x   Heel/toe raises on airex 20x   Small balance board x2 minutes N  Seated Alternate big toe/ 4 toe extensions 10x  BAPS board level 2.0 2x10 PF/DF  BAPS board level 2.0 x15 CW/ CCW (P)  Inv/ Eversion towel Squeezes 10x      Below not performed  Small balance board Taps FWD/ Lateral  (X)   Small balance board FWD/ Lateral 2x30'' (X)  Step ups 6\" fwd 2x10; lat 2x10 (FWD only) (X)  Standing heel raises: x15, off 6 in step  (X)  Fwd lunge at 6 in step to promote R ankle dorsiflexion x15 (X)  SLS 2 x 30\" Airex pad R only  Towel DF slides 10x3\"   Lunge on airex 2x10 bilat (N)  Lunge onto BOSU: x10 fwd (X)    Slantboard 2x1'  R foot doming 2x10 R (X)     NOT this date;   Standing heel/toe raise bilateral 2x15    Tandem stance 3 x 30\" ea lead on airex (X)  BOSU strap IV/EV x10 5\" Hold   Ankle DF, INV, ER: blue band, 2x10  4 way ankle 2x10 R BOSU band  Towel scrunches R LE x2'     Manual Therapy  IASTM brush, stroke, Jhook, to achilles, gastrocnemius, tibialis anterior, plantar fascia  Bevel 0-30 degrees   Massage to extensor digitorum and extensor hallicus   Static and dynamic cupping to Plantar fascia (X))  OP EDUCATION:    Reviewed:   Access Code: [email or web address]  URL: [email or web address]  Date: 12/26/2023  Prepared by: Karen Michaud    Exercises  - Long Sitting Calf Stretch with Strap  - 1 x daily - 7 x weekly - 1 sets - 3 reps - 20-30 second hold  - Standard Lunge  - 1 x daily - 7 x weekly - 1 sets - 10 reps - 5 second hold  - Towel Scrunches  - 1 x daily - 7 x weekly - 1-2 sets - 10 reps  - Seated Heel Toe Raises  - 1 x daily - 7 x " "weekly - 1-2 sets - 10 reps      Goals:  Active       PT Problem       PT Goals (Progressing)       Start:  12/26/23    Expected End:  03/15/24       UPDATE: Improved score of LEFS >70 points for improved ease with ambulation/exercise-Week 4  Increase in LEFS score by 10 points to demonstrate improved functional mobility of B ankles for ease with standing and walking.-Week 4-MET  Improved gross R ankle, knee, hip musculature strength 5/5 MMT to increase stability/support with standing and ambulation at home and community.-Week 4-PARTIALLY MET  UPDATE: Decrease in max pain of R foot 0/10 with completion of ADLs/iADLs to improve QOL.-Week 4-PARTIALLY MET  Decrease in baseline pain of R foot 0/10 to demonstrate improved QOL-Week 4-MET  Improved R ankle AROM DF: 5 deg , PF: 65 deg, INV: 30 deg, EV: 10 deg painfree for improved ease with ADL/iADL completion in standing/weightbearing-Week 4-PARTIALLY MET  Patient will demonstrate compliance in their home exercise program in order to promote independence in self management of functional mobility.-Week 2-MET  Patient Stated Goal: Less pain and to hopefully avoid surgery repair\"-PARTIALLY MET            "

## 2024-02-28 ENCOUNTER — OFFICE VISIT (OUTPATIENT)
Dept: ORTHOPEDIC SURGERY | Facility: CLINIC | Age: 29
End: 2024-02-28
Payer: MEDICARE

## 2024-02-28 ENCOUNTER — APPOINTMENT (OUTPATIENT)
Dept: PHYSICAL THERAPY | Facility: CLINIC | Age: 29
End: 2024-02-28
Payer: MEDICARE

## 2024-02-28 DIAGNOSIS — S93.324A LISFRANC DISLOCATION, RIGHT, INITIAL ENCOUNTER: ICD-10-CM

## 2024-02-28 DIAGNOSIS — R22.41 FOOT MASS, RIGHT: ICD-10-CM

## 2024-02-28 PROCEDURE — 99212 OFFICE O/P EST SF 10 MIN: CPT | Performed by: STUDENT IN AN ORGANIZED HEALTH CARE EDUCATION/TRAINING PROGRAM

## 2024-02-28 PROCEDURE — 3008F BODY MASS INDEX DOCD: CPT | Performed by: STUDENT IN AN ORGANIZED HEALTH CARE EDUCATION/TRAINING PROGRAM

## 2024-02-28 PROCEDURE — 1036F TOBACCO NON-USER: CPT | Performed by: STUDENT IN AN ORGANIZED HEALTH CARE EDUCATION/TRAINING PROGRAM

## 2024-02-28 ASSESSMENT — PAIN - FUNCTIONAL ASSESSMENT: PAIN_FUNCTIONAL_ASSESSMENT: 0-10

## 2024-02-28 ASSESSMENT — PAIN DESCRIPTION - DESCRIPTORS: DESCRIPTORS: SHARP;STABBING

## 2024-02-28 ASSESSMENT — PAIN SCALES - GENERAL: PAINLEVEL_OUTOF10: 5 - MODERATE PAIN

## 2024-02-28 NOTE — PROGRESS NOTES
ORTHOPAEDIC SURGERY PROGRESS NOTE    Chief Complaint:  Right foot pain    History Of Present Illness  Geri Moss is a 29 y.o. female presents for evaluation of right foot pain as a referral from Dr. Del Cid.  Patient carries a diagnosis of epilepsy and had a seizure in December 2022.  She sustained multiple injuries to her right leg and eventually underwent ACL reconstruction which she has recovered well from.  Unfortunately she has continued with 6-7 out of 10 pain in her right foot that is worse with ambulation and prolonged standing.  This is relatively unchanged from the time of injury last year.  She has not been taking any anti-inflammatory medications for her foot.  She has not had any prior CSI nor braces, orthotics or inserts for her shoes.  She denies any interval trauma or associated numbness, tingling and weakness.    01/23/2024: Patient returns for follow-up of her right foot pain.  Patient has been in physical therapy.  Pain overall appears to be decreasing, pain is currently rated as 4 out of 10.  Pain on examination today appears more distal than previous examinations.  She is continuing to work on stretching and therapy.  She denies any trauma and has no associated numbness, tingling or weakness.    02/20/2024: Patient returns for follow-up of her right foot pain.  Patient continues with 5 out of 10 pain in her foot.  Pain continues to be localized more distally about the forefoot and is relatively unchanged from prior examinations.  She denies any associated numbness, tingling or weakness.     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       Surgical History  Recent Surgeries in Orthopaedic Surgery            No cases to display             Social History  Social History     Socioeconomic  History    Marital status: Single     Spouse name: Not on file    Number of children: Not on file    Years of education: Not on file    Highest education level: Not on file   Occupational History    Not on file   Tobacco Use    Smoking status: Never    Smokeless tobacco: Never   Vaping Use    Vaping Use: Never used   Substance and Sexual Activity    Alcohol use: Never    Drug use: Never    Sexual activity: Not on file   Other Topics Concern    Not on file   Social History Narrative    Not on file     Social Determinants of Health     Financial Resource Strain: Not on file   Food Insecurity: Not on file   Transportation Needs: Not on file   Physical Activity: Not on file   Stress: Not on file   Social Connections: Not on file   Intimate Partner Violence: Not on file   Housing Stability: Not on file       Family History  Family History   Problem Relation Name Age of Onset    Other (HTN) Mother      Diabetes type II Mother          WITH DIABETIC MACULAR EDEMA OF RIGHT EYE RECOLVED AFTER TREATMENTS        Allergies  No Known Allergies    Review of Systems  REVIEW OF SYSTEMS  Constitutional: no unplanned weight loss  Psychiatric: no suicidal ideation  ENT: no vision changes, no sinus problems  Pulmonary: no shortness of breath during office visit today  Lymphatic: no enlarged lymph nodes  Cardiovascular: no chest pain or shortness of breath during office visit today  Gastrointestinal: no stomach problems  Genitourinary: no dysuria   Skin: no rashes  Endocrine: no thyroid problems  Neurological: no headache, no numbness  Hematological: no easy bruising  Musculoskeletal: Right foot pain    Physical Exam  PHYSICAL EXAMINATION  Constitutional Exam: well developed and well nourished  Psychiatric Exam: alert and oriented, appropriate mood and behavior  Eye Exam: EOMI  Pulmonary Exam: breathing non-labored, no apparent distress  Lymphatic exam: no appreciable lymphadenopathy in the lower extremities  Cardiovascular exam: RRR to  peripheral palpation, DP pulses 2+, PT 2+, toes are pink with good capillary refill, no pitting edema  Skin exam: no open lesions, rashes, abrasions or ulcerations  Neurological exam: sensation to light touch intact in both lower extremities in peripheral and dermatomal distributions (except for any abnormalities noted in musculoskeletal exam)    Musculoskeletal exam: Right lower extremity examination.  Patient continues with pain localized to the dorsal medial forefoot and nontender to palpation about the midfoot.  There is a continued region of focal soft tissue swelling between the first and second metatarsal.  She has a negative Tinel's overlying this region as well as the anterior tarsal tunnel.  Patient has no pain/discomfort with attempted midfoot pronation/supination/varus/valgus/adduction/abduction stress or pain acute testing of the first and second metatarsal.  Patient has pain-free but limited ankle range of motion, she lacks approximately 40 degrees of dorsiflexion bilaterally that does not improve with knee flexion.  Patient otherwise has pain-free and supple ankle, subtalar midtarsal joint range of motion.  Has sensation intact light touch grossly to saphenous, sural, superficial peroneal, deep peroneal and tibial nerve distribution.  She has 5 out of 5 strength in plantarflexion, dorsiflexion and EHL.  She is 2+ DP/PT pulse palpated.    Last Recorded Vitals  There were no vitals taken for this visit.    Laboratory Results    No results found for this or any previous visit (from the past 24 hour(s)).    Radiology Results   No new imaging at this visit.    Assessment/Plan:  29-year-old female with past medical history of epilepsy and BMI = 55 who presents for evaluation of right chronic foot pain with painful dorsal soft tissue swelling in the forefoot as well as previous MRI findings of midfoot sprain.  I have reviewed the diagnosis and treatment options extensively with the patient.  In my impression  the patient should continue weightbearing as tolerated in her right lower extremity.  I will recommend that we obtain an MRI of her right foot with and without contrast to more completely evaluate her soft tissue swelling in the forefoot.  I will plan to see the patient back following completion of that study for discussion regarding next treatment steps.  Upon return, patient would not require further imaging.    Delmer Hussein MD, SANCHEZ  Department of Orthopaedic Surgery  [facility]    The diagnosis and treatment plan were reviewed with the patient. All questions were answered. The patient verbalized understanding of the treatment plan. There were no barriers to understanding identified.    Note dictated with [email or web address] software.  Completed without full type editing and sent to avoid delay.

## 2024-03-04 ENCOUNTER — TREATMENT (OUTPATIENT)
Dept: PHYSICAL THERAPY | Facility: CLINIC | Age: 29
End: 2024-03-04
Payer: MEDICARE

## 2024-03-04 DIAGNOSIS — Z87.828 HISTORY OF FOOT SPRAIN: ICD-10-CM

## 2024-03-04 DIAGNOSIS — M79.671 BILATERAL FOOT PAIN: ICD-10-CM

## 2024-03-04 DIAGNOSIS — M79.672 BILATERAL FOOT PAIN: ICD-10-CM

## 2024-03-04 PROCEDURE — 97140 MANUAL THERAPY 1/> REGIONS: CPT | Mod: GP,CQ,KX

## 2024-03-04 PROCEDURE — 97110 THERAPEUTIC EXERCISES: CPT | Mod: GP,CQ,KX

## 2024-03-04 ASSESSMENT — PAIN SCALES - GENERAL: PAINLEVEL_OUTOF10: 0 - NO PAIN

## 2024-03-04 ASSESSMENT — PAIN - FUNCTIONAL ASSESSMENT: PAIN_FUNCTIONAL_ASSESSMENT: 0-10

## 2024-03-04 NOTE — PROGRESS NOTES
Physical Therapy Treatment    Patient Name: Geri Moss  MRN: [id number]  Today's Date: 3/4/2024  Time Calculation  Start Time: 0744  Stop Time: 0827  Time Calculation (min): 43 min  PT Therapeutic Procedures Time Entry  Manual Therapy Time Entry: 16  Therapeutic Exercise Time Entry: 25              Current Problem  Problem List Items Addressed This Visit             ICD-10-CM       Musculoskeletal and Injuries    Bilateral foot pain M79.671, M79.672    History of foot sprain Z87.828         Subjective She reports no pain but tightness limiting her R ankle mobility. Notes restriction  near Achilles and distal calf.   General  General Comment: visits 10/12 POC  Precautions  Precautions  Precautions Comment: Seizure precautions; R LE WBAT; hx of migraines; Hx of R ACL repair 4/2023  Pain  Pain Assessment: 0-10  Pain Score: 0 - No pain  Pain Location: Ankle  Pain Orientation: Right    Assessment: Patient identified by name and birth date.  IASTM/STM and cupping completed to reduce pain and soft tissue restriction in R ankle and lower leg musculature. After manual she was able to obtain DF past neutral. She was able to complete all CKC exercises without c/o.        Plan:Continue with manual therapy to reduce soft tissue restriction /pain and ROM exercises for improved R ankle mobility for ease with community ambulation and increased endurance with standing ADLs.-CG.        OP PT Plan  Treatment/Interventions: Aquatic therapy, Cryotherapy, Education/ Instruction, Gait training, Manual therapy, Taping techniques, Therapeutic activities, Therapeutic exercises, Ultrasound (IASTM/cupping)  PT Plan: Skilled PT  PT Frequency: 2 times per week  Duration: 6 weeks for 12 additional visits in POC  Onset Date: 12/01/22  Certification Period Start Date: 12/26/23  Certification Period End Date: 03/25/24    Treatments:         Therapeutic Exercise  Recumbent bike x5' LEVEL 3.0   Slantboard gastroc stretch 2x1 min  Calf raise  "eccentric only 10x   Heel/toe raises  off edge of step 15x (N)  Small balance board 2x1 min  Seated Alternate big toe/ 4 toe extensions 10x  BAPS board level 2.0 2x10 PF/DF  BAPS board level 2.0 x15 CW/ CCW   Inv/ Eversion towel Squeezes 10x  Small balance board Taps FWD/ Lateral    Small balance board FWD/ Lateral 2x1 min   Step ups 6\" fwd 2x10; lat 2x10 (FWD only) (X)  Standing heel raises: x15, off 6 in step  (X)  Fwd lunge at 6 in step to promote R ankle dorsiflexion x15 (X)  SLS 2 x 30\" Airex pad R only  Towel DF slides 10x   Lunge onto BOSU: x10 fwd   Slantboard 2x1'  R foot doming 2x10 R (X)     NOT this date;   Standing heel/toe raise bilateral 2x15    Tandem stance 3 x 30\" ea lead on airex (X)  BOSU strap IV/EV x10 5\" Hold   Ankle DF, INV, ER: blue band, 2x10  4 way ankle 2x10 R BOSU band  Towel scrunches R LE x2'      Manual Therapy  IASTM brush, stroke, Jhook, to achilles, gastrocnemius, tibialis anterior, plantar fascia  Bevel 0-30 degrees              Massage to extensor digitorum and extensor hallicus   Static and dynamic cupping to Plantar fascia/gastrocs/soleus            OP EDUCATION:     Access Code: [email or web address]  URL: [email or web address]  Date: 12/26/2023  Prepared by: Karen Michaud    Exercises  - Long Sitting Calf Stretch with Strap  - 1 x daily - 7 x weekly - 1 sets - 3 reps - 20-30 second hold  - Standard Lunge  - 1 x daily - 7 x weekly - 1 sets - 10 reps - 5 second hold  - Towel Scrunches  - 1 x daily - 7 x weekly - 1-2 sets - 10 reps  - Seated Heel Toe Raises  - 1 x daily - 7 x weekly - 1-2 sets - 10 reps       Goals:  Active       PT Problem       PT Goals (Progressing)       Start:  12/26/23    Expected End:  03/15/24       UPDATE: Improved score of LEFS >70 points for improved ease with ambulation/exercise-Week 4  Increase in LEFS score by 10 points to demonstrate improved functional mobility of B ankles for ease with standing and walking.-Week 4-MET  Improved " "gross R ankle, knee, hip musculature strength 5/5 MMT to increase stability/support with standing and ambulation at home and community.-Week 4-PARTIALLY MET  UPDATE: Decrease in max pain of R foot 0/10 with completion of ADLs/iADLs to improve QOL.-Week 4-PARTIALLY MET  Decrease in baseline pain of R foot 0/10 to demonstrate improved QOL-Week 4-MET  Improved R ankle AROM DF: 5 deg , PF: 65 deg, INV: 30 deg, EV: 10 deg painfree for improved ease with ADL/iADL completion in standing/weightbearing-Week 4-PARTIALLY MET  Patient will demonstrate compliance in their home exercise program in order to promote independence in self management of functional mobility.-Week 2-MET  Patient Stated Goal: Less pain and to hopefully avoid surgery repair\"-PARTIALLY MET            "

## 2024-03-06 ENCOUNTER — TREATMENT (OUTPATIENT)
Dept: PHYSICAL THERAPY | Facility: CLINIC | Age: 29
End: 2024-03-06
Payer: MEDICARE

## 2024-03-06 DIAGNOSIS — M79.672 BILATERAL FOOT PAIN: ICD-10-CM

## 2024-03-06 DIAGNOSIS — M79.671 BILATERAL FOOT PAIN: ICD-10-CM

## 2024-03-06 DIAGNOSIS — Z87.828 HISTORY OF FOOT SPRAIN: ICD-10-CM

## 2024-03-06 PROCEDURE — 97140 MANUAL THERAPY 1/> REGIONS: CPT | Mod: GP,CQ,KX

## 2024-03-06 PROCEDURE — 97110 THERAPEUTIC EXERCISES: CPT | Mod: GP,CQ,KX

## 2024-03-06 ASSESSMENT — PAIN SCALES - GENERAL: PAINLEVEL_OUTOF10: 0 - NO PAIN

## 2024-03-06 ASSESSMENT — PAIN - FUNCTIONAL ASSESSMENT: PAIN_FUNCTIONAL_ASSESSMENT: 0-10

## 2024-03-06 NOTE — PROGRESS NOTES
Physical Therapy Treatment    Patient Name: Geri Moss  MRN: [id number]  Today's Date: 3/6/2024  Time Calculation  Start Time: 0744  Stop Time: 0828  Time Calculation (min): 44 min  PT Therapeutic Procedures Time Entry  Manual Therapy Time Entry: 14  Therapeutic Exercise Time Entry: 28              Current Problem  Problem List Items Addressed This Visit             ICD-10-CM       Musculoskeletal and Injuries    Bilateral foot pain M79.671, M79.672    History of foot sprain Z87.828         Subjective  She reports decreased tightness in R ankle and denies pain. Feels she is still fatigued with prolonged walking/standing but this has improved as well.   General  General Comment: visits 11/12 POC  Precautions  Precautions  Precautions Comment: Seizure precautions; R LE WBAT; hx of migraines; Hx of R ACL repair 4/2023  Pain  Pain Assessment: 0-10  Pain Score: 0 - No pain  Pain Location: Ankle  Pain Orientation: Right    Assessment:Patient identified by name and birth date. IASTM/STM completed to reduce pain and soft tissue restriction in  lower leg and ankle musculature. She was able to complete all CKC exercises without c/o. Noted improved AROM at R ankle and increased push off during step through gait sequence.          Plan: Patient to see physical therapist next session for reassessment.     OP PT Plan  Treatment/Interventions: Aquatic therapy, Cryotherapy, Education/ Instruction, Gait training, Manual therapy, Taping techniques, Therapeutic activities, Therapeutic exercises, Ultrasound (IASTM/cupping)  PT Plan: Skilled PT  PT Frequency: 2 times per week  Duration: 6 weeks for 12 additional visits in POC  Onset Date: 12/01/22  Certification Period Start Date: 12/26/23  Certification Period End Date: 03/25/24    Treatments:   Therapeutic Exercise  Recumbent bike x5' LEVEL 3.0   Slantboard gastroc stretch 2x1 min  Calf raise eccentric only 10x   Heel/toe raises  off edge of step 15x   Small balance board 2x1  "min  Seated Alternate big toe/ 4 toe extensions 10x  BAPS board level 2.0 2x10 PF/DF  BAPS board level 2.0 x15 CW/ CCW   Inv/ Eversion towel Squeezes 10x  Small balance board Taps FWD/ Lateral    Small balance board FWD/ Lateral 2x1 min   Step ups 6\" fwd 2x10; lat 2x10 (FWD only) (X)  Standing heel raises: x15, off 6 in step  (X)  Fwd lunge at 6 in step to promote R ankle dorsiflexion x15   SLS 2 x 30\" Airex pad R only  Towel DF slides 10x   Lunge onto BOSU: x10 fwd   Slantboard 2x1'  R foot doming 2x10 R (X)     NOT this date;   Standing heel/toe raise bilateral 2x15    Tandem stance 3 x 30\" ea lead on airex (X)  BOSU strap IV/EV x10 5\" Hold   Ankle DF, INV, ER: blue band, 2x10  4 way ankle 2x10 R BOSU band  Towel scrunches R LE x2'      Manual Therapy  IASTM brush, stroke, Jhook, to achilles, gastrocnemius, tibialis anterior, plantar fascia  Bevel 0-30 degrees              Massage to extensor digitorum and extensor hallicus   Static and dynamic cupping to Plantar fascia/gastrocs/soleus                  OP EDUCATION:     Access Code: [email or web address]  URL: [email or web address]  Date: 12/26/2023  Prepared by: Karen Michaud    Exercises  - Long Sitting Calf Stretch with Strap  - 1 x daily - 7 x weekly - 1 sets - 3 reps - 20-30 second hold  - Standard Lunge  - 1 x daily - 7 x weekly - 1 sets - 10 reps - 5 second hold  - Towel Scrunches  - 1 x daily - 7 x weekly - 1-2 sets - 10 reps  - Seated Heel Toe Raises  - 1 x daily - 7 x weekly - 1-2 sets - 10 reps       Goals:  Active       PT Problem       PT Goals (Progressing)       Start:  12/26/23    Expected End:  03/15/24       UPDATE: Improved score of LEFS >70 points for improved ease with ambulation/exercise-Week 4  Increase in LEFS score by 10 points to demonstrate improved functional mobility of B ankles for ease with standing and walking.-Week 4-MET  Improved gross R ankle, knee, hip musculature strength 5/5 MMT to increase stability/support with " "standing and ambulation at home and community.-Week 4-PARTIALLY MET  UPDATE: Decrease in max pain of R foot 0/10 with completion of ADLs/iADLs to improve QOL.-Week 4-PARTIALLY MET  Decrease in baseline pain of R foot 0/10 to demonstrate improved QOL-Week 4-MET  Improved R ankle AROM DF: 5 deg , PF: 65 deg, INV: 30 deg, EV: 10 deg painfree for improved ease with ADL/iADL completion in standing/weightbearing-Week 4-PARTIALLY MET  Patient will demonstrate compliance in their home exercise program in order to promote independence in self management of functional mobility.-Week 2-MET  Patient Stated Goal: Less pain and to hopefully avoid surgery repair\"-PARTIALLY MET            "

## 2024-03-11 ENCOUNTER — TREATMENT (OUTPATIENT)
Dept: PHYSICAL THERAPY | Facility: CLINIC | Age: 29
End: 2024-03-11
Payer: MEDICARE

## 2024-03-11 DIAGNOSIS — Z87.828 HISTORY OF FOOT SPRAIN: ICD-10-CM

## 2024-03-11 DIAGNOSIS — M79.671 BILATERAL FOOT PAIN: ICD-10-CM

## 2024-03-11 DIAGNOSIS — M79.672 BILATERAL FOOT PAIN: ICD-10-CM

## 2024-03-11 PROCEDURE — 97140 MANUAL THERAPY 1/> REGIONS: CPT | Mod: GP,KX

## 2024-03-11 ASSESSMENT — PAIN - FUNCTIONAL ASSESSMENT: PAIN_FUNCTIONAL_ASSESSMENT: 0-10

## 2024-03-11 ASSESSMENT — PAIN SCALES - GENERAL: PAINLEVEL_OUTOF10: 0 - NO PAIN

## 2024-03-11 NOTE — PROGRESS NOTES
Physical Therapy Treatment    Patient Name: Geri Moss  MRN: [id number]  Today's Date: 3/11/2024  Time Calculation  Start Time: 0901  Stop Time: 0928  Time Calculation (min): 27 min  PT Therapeutic Procedures Time Entry  Manual Therapy Time Entry: 16          Assessment: Geri Moss is progressing well through their POC addressing R foot pain. Pt has attended 12 additional PT sessions in this POC with therapeutic exercise and manual therapy interventions. The pt demonstrates and verbalizes improvements in R ankle AROM and R ankle musculature strength. However, continues to have restriction of R achilles tendon at insertion and AROM restriction without passive overpressure. Verbal review of current HEP with emphasis of achilles mobility. HEP handout provided. Patient verbalized understanding.    Pt is being placed on hold for 30 days to attempt performing their home exercise program independently. Pt instructed to contact with any problems, questions, or adjustments. This will serve as the patient's discharge if they elect not to resume skilled PT within 30 days. Pt verbalized understanding and agreement to goals and POC. Thank you for this referral and please call [phone] with any questions or concerns.         Plan: Pt is being placed on hold for 30 days to attempt performing their home exercise program independently. Pt instructed to contact with any problems, questions, or adjustments. This will serve as the patient's discharge if they elect not to resume skilled PT within 30 days.     OP PT Plan  PT Plan: No Additional PT interventions required at this time  Onset Date: 12/01/22  Certification Period Start Date: 12/26/23  Certification Period End Date: 03/25/24      Current Problem  Problem List Items Addressed This Visit             ICD-10-CM    Bilateral foot pain M79.671, M79.672    History of foot sprain Z87.828           Subjective    General  General Comment: visits 12/12 POC  Patient  "reports no current pain of R ankle. She is having a MRI performed this week and seeing ortho later this week. Compliant with current HEP. She is attempting to push back surgery until absolutely necessary. Her foot does feel somewhat better throughout the week but still painful with prolonged standing/ambulation.    Precautions  Precautions  Precautions Comment: Seizure precautions; R LE WBAT; hx of migraines; Hx of R ACL repair 4/2023  Pain  Pain Assessment: 0-10  Pain Score: 0 - No pain  Pain Location: Ankle  Pain Orientation: Right    Ankle AROM  R ankle dorsiflexion: (10°): 9 deg from neutral>7 deg DF> 4 deg of DF with passive overpressure  R ankle inversion: (30°): 20 deg>36 deg>40 deg with passive overpressure  R ankle eversion: (20°): 4 deg>12 deg>12 deg with passive overpressure     Ankle MMT  R ankle dorsiflexion: (5/5): 4>4+/5>5  R ankle plantarflexion: (5/5): 4>4+/5>5  R ankle inversion: (5/5): 4>4+/5>5  R ankle eversio: (5/5): 4>4+/5>5     Outcome Measures:  Other Measures  Lower Extremity Funtional Score (LEFS): 47/80 >62/80>65/80    Treatments:   Therapeutic Exercise  Below not performed  Recumbent bike x5' LEVEL 3.0   Slantboard gastroc stretch 2x1 min  Calf raise eccentric only 10x   Heel/toe raises  off edge of step 15x   Small balance board 2x1 min  Seated Alternate big toe/ 4 toe extensions 10x  BAPS board level 2.0 2x10 PF/DF  BAPS board level 2.0 x15 CW/ CCW   Inv/ Eversion towel Squeezes 10x  Small balance board Taps FWD/ Lateral    Small balance board FWD/ Lateral 2x1 min   Step ups 6\" fwd 2x10; lat 2x10 (FWD only) (X)  Standing heel raises: x15, off 6 in step  (X)  Fwd lunge at 6 in step to promote R ankle dorsiflexion x15   SLS 2 x 30\" Airex pad R only  Towel DF slides 10x   Lunge onto BOSU: x10 fwd   Slantboard 2x1'  R foot doming 2x10 R (X)     NOT this date;   Standing heel/toe raise bilateral 2x15    Tandem stance 3 x 30\" ea lead on airex (X)  BOSU strap IV/EV x10 5\" Hold   Ankle DF, INV, " ER: blue band, 2x10  4 way ankle 2x10 R BOSU band  Towel scrunches R LE x2'      Manual Therapy  IASTM brush, stroke, Jhook, to achilles, gastrocnemius, tibialis anterior, plantar fascia  Bevel 0-30 degrees              Massage to extensor digitorum and extensor hallicus   HEP and POC performed throughout  Static and dynamic cupping to Plantar fascia/gastrocs/soleusX                  OP EDUCATION:   Access Code: [email or web address]  URL: [email or web address]  Date: 03/11/2024  Prepared by: Karen Michaud    Exercises  - Long Sitting Calf Stretch with Strap  - 1 x daily - 7 x weekly - 1 sets - 3 reps - 20-30 sec hold  - Heel Raises with Counter Support  - 1 x daily - 7 x weekly - 1-2 sets - 10 reps  - Standing Bilateral Gastroc Stretch with Step  - 1 x daily - 7 x weekly - 1-2 sets - 10 reps  Access Code: [email or web address]  URL: [email or web address]  Date: 12/26/2023  Prepared by: Karen Michaud    Exercises  - Long Sitting Calf Stretch with Strap  - 1 x daily - 7 x weekly - 1 sets - 3 reps - 20-30 second hold  - Standard Lunge  - 1 x daily - 7 x weekly - 1 sets - 10 reps - 5 second hold  - Towel Scrunches  - 1 x daily - 7 x weekly - 1-2 sets - 10 reps  - Seated Heel Toe Raises  - 1 x daily - 7 x weekly - 1-2 sets - 10 reps       Goals:  Active       PT Problem       PT Goals (Progressing)       Start:  12/26/23    Expected End:  03/15/24       UPDATE: Improved score of LEFS >70 points for improved ease with ambulation/exercise-Week 4-PARTIALLY MET  Increase in LEFS score by 10 points to demonstrate improved functional mobility of B ankles for ease with standing and walking.-Week 4-MET  Improved gross R ankle, knee, hip musculature strength 5/5 MMT to increase stability/support with standing and ambulation at home and community.-Week 4-MET  UPDATE: Decrease in max pain of R foot 0/10 with completion of ADLs/iADLs to improve QOL.-Week 4-PARTIALLY MET  Decrease in baseline pain of R  "foot 0/10 to demonstrate improved QOL-Week 4-MET  Improved R ankle AROM DF: 5 deg , PF: 65 deg, INV: 30 deg, EV: 10 deg painfree for improved ease with ADL/iADL completion in standing/weightbearing-Week 4-PARTIALLY MET  Patient will demonstrate compliance in their home exercise program in order to promote independence in self management of functional mobility.-Week 2-MET  Patient Stated Goal: Less pain and to hopefully avoid surgery repair\"-PARTIALLY MET            "

## 2024-03-13 ENCOUNTER — HOSPITAL ENCOUNTER (OUTPATIENT)
Dept: RADIOLOGY | Facility: HOSPITAL | Age: 29
Discharge: HOME | End: 2024-03-13
Payer: MEDICARE

## 2024-03-13 DIAGNOSIS — R22.41 FOOT MASS, RIGHT: ICD-10-CM

## 2024-03-13 PROCEDURE — 73720 MRI LWR EXTREMITY W/O&W/DYE: CPT | Mod: RIGHT SIDE | Performed by: STUDENT IN AN ORGANIZED HEALTH CARE EDUCATION/TRAINING PROGRAM

## 2024-03-13 PROCEDURE — A9575 INJ GADOTERATE MEGLUMI 0.1ML: HCPCS | Performed by: STUDENT IN AN ORGANIZED HEALTH CARE EDUCATION/TRAINING PROGRAM

## 2024-03-13 PROCEDURE — 73720 MRI LWR EXTREMITY W/O&W/DYE: CPT | Mod: RT

## 2024-03-13 PROCEDURE — 2550000001 HC RX 255 CONTRASTS: Performed by: STUDENT IN AN ORGANIZED HEALTH CARE EDUCATION/TRAINING PROGRAM

## 2024-03-13 RX ORDER — GADOTERATE MEGLUMINE 376.9 MG/ML
30 INJECTION INTRAVENOUS
Status: COMPLETED | OUTPATIENT
Start: 2024-03-13 | End: 2024-03-13

## 2024-03-13 RX ADMIN — GADOTERATE MEGLUMINE 30 ML: 376.9 INJECTION INTRAVENOUS at 09:17

## 2024-03-14 ENCOUNTER — OFFICE VISIT (OUTPATIENT)
Dept: ORTHOPEDIC SURGERY | Facility: CLINIC | Age: 29
End: 2024-03-14
Payer: MEDICARE

## 2024-03-14 VITALS — BODY MASS INDEX: 50.02 KG/M2 | HEIGHT: 64 IN | WEIGHT: 293 LBS

## 2024-03-14 DIAGNOSIS — S93.324A LISFRANC DISLOCATION, RIGHT, INITIAL ENCOUNTER: ICD-10-CM

## 2024-03-14 DIAGNOSIS — M67.01 ACHILLES TENDON CONTRACTURE, RIGHT: Primary | ICD-10-CM

## 2024-03-14 DIAGNOSIS — M77.51 BURSITIS OF RIGHT FOOT: ICD-10-CM

## 2024-03-14 PROCEDURE — 99213 OFFICE O/P EST LOW 20 MIN: CPT | Performed by: STUDENT IN AN ORGANIZED HEALTH CARE EDUCATION/TRAINING PROGRAM

## 2024-03-14 PROCEDURE — 1036F TOBACCO NON-USER: CPT | Performed by: STUDENT IN AN ORGANIZED HEALTH CARE EDUCATION/TRAINING PROGRAM

## 2024-03-14 PROCEDURE — 3008F BODY MASS INDEX DOCD: CPT | Performed by: STUDENT IN AN ORGANIZED HEALTH CARE EDUCATION/TRAINING PROGRAM

## 2024-03-14 ASSESSMENT — PAIN - FUNCTIONAL ASSESSMENT: PAIN_FUNCTIONAL_ASSESSMENT: NO/DENIES PAIN

## 2024-03-14 NOTE — PROGRESS NOTES
ORTHOPAEDIC SURGERY PROGRESS NOTE    Chief Complaint:  Right foot pain    History Of Present Illness  Geri Moss is a 29 y.o. female presents for evaluation of right foot pain as a referral from Dr. Del Cid.  Patient carries a diagnosis of epilepsy and had a seizure in December 2022.  She sustained multiple injuries to her right leg and eventually underwent ACL reconstruction which she has recovered well from.  Unfortunately she has continued with 6-7 out of 10 pain in her right foot that is worse with ambulation and prolonged standing.  This is relatively unchanged from the time of injury last year.  She has not been taking any anti-inflammatory medications for her foot.  She has not had any prior CSI nor braces, orthotics or inserts for her shoes.  She denies any interval trauma or associated numbness, tingling and weakness.    01/23/2024: Patient returns for follow-up of her right foot pain.  Patient has been in physical therapy.  Pain overall appears to be decreasing, pain is currently rated as 4 out of 10.  Pain on examination today appears more distal than previous examinations.  She is continuing to work on stretching and therapy.  She denies any trauma and has no associated numbness, tingling or weakness.    02/20/2024: Patient returns for follow-up of her right foot pain.  Patient continues with 5 out of 10 pain in her foot.  Pain continues to be localized more distally about the forefoot and is relatively unchanged from prior examinations.  She denies any associated numbness, tingling or weakness.    03/14/2024: Patient returns for follow-up of her right foot pain.  Patient has minimal pain on examination today but continues to localize to the distal forefoot.  She has completed her MRI and is here for discussion regarding next treatment steps.  She denies new trauma.     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       Surgical History  Recent Surgeries in Orthopaedic Surgery            No cases to display             Social History  Social History     Socioeconomic History    Marital status: Single     Spouse name: None    Number of children: None    Years of education: None    Highest education level: None   Occupational History    None   Tobacco Use    Smoking status: Never    Smokeless tobacco: Never   Vaping Use    Vaping Use: Never used   Substance and Sexual Activity    Alcohol use: Never    Drug use: Never    Sexual activity: None   Other Topics Concern    None   Social History Narrative    None     Social Determinants of Health     Financial Resource Strain: Not on file   Food Insecurity: Not on file   Transportation Needs: Not on file   Physical Activity: Not on file   Stress: Not on file   Social Connections: Not on file   Intimate Partner Violence: Not on file   Housing Stability: Not on file       Family History  Family History   Problem Relation Name Age of Onset    Other (HTN) Mother      Diabetes type II Mother          WITH DIABETIC MACULAR EDEMA OF RIGHT EYE RECOLVED AFTER TREATMENTS        Allergies  No Known Allergies    Review of Systems  REVIEW OF SYSTEMS  Constitutional: no unplanned weight loss  Psychiatric: no suicidal ideation  ENT: no vision changes, no sinus problems  Pulmonary: no shortness of breath during office visit today  Lymphatic: no enlarged lymph nodes  Cardiovascular: no chest pain or shortness of breath during office visit today  Gastrointestinal: no stomach problems  Genitourinary: no dysuria   Skin: no rashes  Endocrine: no thyroid problems  Neurological: no headache, no numbness  Hematological: no easy bruising  Musculoskeletal: Right foot pain    Physical Exam  PHYSICAL EXAMINATION  Constitutional Exam: well developed and well nourished  Psychiatric Exam: alert and  "oriented, appropriate mood and behavior  Eye Exam: EOMI  Pulmonary Exam: breathing non-labored, no apparent distress  Lymphatic exam: no appreciable lymphadenopathy in the lower extremities  Cardiovascular exam: RRR to peripheral palpation, DP pulses 2+, PT 2+, toes are pink with good capillary refill, no pitting edema  Skin exam: no open lesions, rashes, abrasions or ulcerations  Neurological exam: sensation to light touch intact in both lower extremities in peripheral and dermatomal distributions (except for any abnormalities noted in musculoskeletal exam)    Musculoskeletal exam: Right lower extremity examination.  Patient pain continues to localize to the dorsal distal medial forefoot about the first webspace.  She continues with a negative Tinel's overlying the anterior tarsal tunnel and is without tenderness to palpation overlying the medial sesamoid or dorsal midfoot.  She has no significant pain or discomfort with attempted midfoot stress.  She has no significant pain with piano key testing of the metatarsals. Patient has pain-free but limited ankle range of motion, she lacks approximately 40 degrees of dorsiflexion bilaterally that does not improve with knee flexion.  Patient otherwise has pain-free and supple ankle, subtalar midtarsal joint range of motion.  Has sensation intact light touch grossly to saphenous, sural, superficial peroneal, deep peroneal and tibial nerve distribution.  She has 5 out of 5 strength in plantarflexion, dorsiflexion and EHL.  She is 2+ DP/PT pulse palpated.    Last Recorded Vitals  Height 1.626 m (5' 4\"), weight (!) 152 kg (336 lb).    Laboratory Results    No results found for this or any previous visit (from the past 24 hour(s)).    Radiology Results   MRI right foot with and without contrast reviewed from 03/13/2024 and independently evaluated by me on 03/14/2024 demonstrates degenerative changes the medial sesamoid articulation with associated subchondral cystic changes.  " There is continued evidence of Lisfranc sprain without obvious tear.  There is significant 1 through 3 intermetatarsal bursitis.    Assessment/Plan:  29-year-old female with past medical history of epilepsy and BMI = 58 who presents for evaluation of right chronic foot pain with MRI proven 1 through 3 intermetatarsal bursitis in the setting of significant equinus contracture and known midfoot sprain.  I have reviewed the diagnosis and treatment options extensively with the patient.  Clinically the majority of her symptoms appear to be emanating from her intermetatarsal bursitis, she is without significant pain at her medial sesamoids or about the midfoot.  I discussed that she may exhaust conservative management in the form of activity modification, anti-inflammatories and physical therapy for Achilles tendon stretching.  From a surgical perspective she could consider a right LENCHO as this would be likely to offload her significant forefoot bursitis.  I discussed that we could also perform a stress examination of her midfoot under anesthesia with fixation as indicated, however based on her serial radiographs and MRI do not anticipate significant diastases.  The patient is going to take some time to consider her options.  I will provide her with a formal referral to physical therapy in the interim and plan to see the patient back in approximately 8 weeks for repeat clinical evaluation.  Upon return, patient would not require further imaging.    Delmer Hussein MD, SANCHEZ  Department of Orthopaedic Surgery  [facility]    The diagnosis and treatment plan were reviewed with the patient. All questions were answered. The patient verbalized understanding of the treatment plan. There were no barriers to understanding identified.    Note dictated with [employer] software.  Completed without full type editing and sent to avoid delay.

## 2024-05-14 ENCOUNTER — APPOINTMENT (OUTPATIENT)
Dept: ORTHOPEDIC SURGERY | Facility: CLINIC | Age: 29
End: 2024-05-14
Payer: MEDICARE

## 2024-07-30 ENCOUNTER — LAB (OUTPATIENT)
Dept: LAB | Facility: LAB | Age: 29
End: 2024-07-30
Payer: MEDICARE

## 2024-07-30 ENCOUNTER — APPOINTMENT (OUTPATIENT)
Dept: PRIMARY CARE | Facility: CLINIC | Age: 29
End: 2024-07-30
Payer: MEDICARE

## 2024-07-30 VITALS
WEIGHT: 293 LBS | HEIGHT: 64 IN | SYSTOLIC BLOOD PRESSURE: 124 MMHG | DIASTOLIC BLOOD PRESSURE: 70 MMHG | HEART RATE: 88 BPM | BODY MASS INDEX: 50.02 KG/M2

## 2024-07-30 DIAGNOSIS — R22.1 LOCALIZED SWELLING, MASS AND LUMP, NECK: ICD-10-CM

## 2024-07-30 DIAGNOSIS — F41.1 GENERALIZED ANXIETY DISORDER: ICD-10-CM

## 2024-07-30 DIAGNOSIS — R22.1 LOCALIZED SWELLING, MASS AND LUMP, NECK: Primary | ICD-10-CM

## 2024-07-30 DIAGNOSIS — E55.9 VITAMIN D DEFICIENCY: ICD-10-CM

## 2024-07-30 DIAGNOSIS — R73.03 PREDIABETES: ICD-10-CM

## 2024-07-30 DIAGNOSIS — E66.01 CLASS 3 SEVERE OBESITY DUE TO EXCESS CALORIES WITH SERIOUS COMORBIDITY AND BODY MASS INDEX (BMI) OF 50.0 TO 59.9 IN ADULT (MULTI): ICD-10-CM

## 2024-07-30 LAB
25(OH)D3 SERPL-MCNC: 19 NG/ML (ref 30–100)
ALBUMIN SERPL BCP-MCNC: 4 G/DL (ref 3.4–5)
ALP SERPL-CCNC: 103 U/L (ref 33–110)
ALT SERPL W P-5'-P-CCNC: 25 U/L (ref 7–45)
ANION GAP SERPL CALC-SCNC: 12 MMOL/L (ref 10–20)
AST SERPL W P-5'-P-CCNC: 16 U/L (ref 9–39)
BASOPHILS # BLD AUTO: 0.05 X10*3/UL (ref 0–0.1)
BASOPHILS NFR BLD AUTO: 0.4 %
BILIRUB SERPL-MCNC: 0.4 MG/DL (ref 0–1.2)
BUN SERPL-MCNC: 13 MG/DL (ref 6–23)
CALCIUM SERPL-MCNC: 9 MG/DL (ref 8.6–10.3)
CHLORIDE SERPL-SCNC: 102 MMOL/L (ref 98–107)
CO2 SERPL-SCNC: 26 MMOL/L (ref 21–32)
CREAT SERPL-MCNC: 0.69 MG/DL (ref 0.5–1.05)
EGFRCR SERPLBLD CKD-EPI 2021: >90 ML/MIN/1.73M*2
EOSINOPHIL # BLD AUTO: 0.11 X10*3/UL (ref 0–0.7)
EOSINOPHIL NFR BLD AUTO: 0.9 %
ERYTHROCYTE [DISTWIDTH] IN BLOOD BY AUTOMATED COUNT: 13.9 % (ref 11.5–14.5)
EST. AVERAGE GLUCOSE BLD GHB EST-MCNC: 117 MG/DL
GLUCOSE SERPL-MCNC: 90 MG/DL (ref 74–99)
HBA1C MFR BLD: 5.7 %
HCT VFR BLD AUTO: 40.9 % (ref 36–46)
HGB BLD-MCNC: 12.6 G/DL (ref 12–16)
IMM GRANULOCYTES # BLD AUTO: 0.06 X10*3/UL (ref 0–0.7)
IMM GRANULOCYTES NFR BLD AUTO: 0.5 % (ref 0–0.9)
LYMPHOCYTES # BLD AUTO: 2.74 X10*3/UL (ref 1.2–4.8)
LYMPHOCYTES NFR BLD AUTO: 21.7 %
MCH RBC QN AUTO: 26.6 PG (ref 26–34)
MCHC RBC AUTO-ENTMCNC: 30.8 G/DL (ref 32–36)
MCV RBC AUTO: 86 FL (ref 80–100)
MONOCYTES # BLD AUTO: 0.59 X10*3/UL (ref 0.1–1)
MONOCYTES NFR BLD AUTO: 4.7 %
NEUTROPHILS # BLD AUTO: 9.05 X10*3/UL (ref 1.2–7.7)
NEUTROPHILS NFR BLD AUTO: 71.8 %
NRBC BLD-RTO: 0 /100 WBCS (ref 0–0)
PLATELET # BLD AUTO: 444 X10*3/UL (ref 150–450)
POTASSIUM SERPL-SCNC: 4.4 MMOL/L (ref 3.5–5.3)
PROT SERPL-MCNC: 7.3 G/DL (ref 6.4–8.2)
RBC # BLD AUTO: 4.74 X10*6/UL (ref 4–5.2)
SODIUM SERPL-SCNC: 136 MMOL/L (ref 136–145)
TSH SERPL-ACNC: 3.48 MIU/L (ref 0.44–3.98)
VIT B12 SERPL-MCNC: 217 PG/ML (ref 211–911)
WBC # BLD AUTO: 12.6 X10*3/UL (ref 4.4–11.3)

## 2024-07-30 PROCEDURE — 82306 VITAMIN D 25 HYDROXY: CPT

## 2024-07-30 PROCEDURE — 83036 HEMOGLOBIN GLYCOSYLATED A1C: CPT

## 2024-07-30 PROCEDURE — 80053 COMPREHEN METABOLIC PANEL: CPT

## 2024-07-30 PROCEDURE — 36415 COLL VENOUS BLD VENIPUNCTURE: CPT

## 2024-07-30 PROCEDURE — 85025 COMPLETE CBC W/AUTO DIFF WBC: CPT

## 2024-07-30 PROCEDURE — 82607 VITAMIN B-12: CPT

## 2024-07-30 PROCEDURE — 99214 OFFICE O/P EST MOD 30 MIN: CPT | Performed by: NURSE PRACTITIONER

## 2024-07-30 PROCEDURE — 1036F TOBACCO NON-USER: CPT | Performed by: NURSE PRACTITIONER

## 2024-07-30 PROCEDURE — 3008F BODY MASS INDEX DOCD: CPT | Performed by: NURSE PRACTITIONER

## 2024-07-30 PROCEDURE — 84443 ASSAY THYROID STIM HORMONE: CPT

## 2024-07-30 RX ORDER — BUSPIRONE HYDROCHLORIDE 5 MG/1
5 TABLET ORAL 3 TIMES DAILY PRN
Qty: 90 TABLET | Refills: 1 | Status: SHIPPED | OUTPATIENT
Start: 2024-07-30

## 2024-07-30 RX ORDER — ESCITALOPRAM OXALATE 20 MG/1
20 TABLET ORAL DAILY
Qty: 90 TABLET | Refills: 1 | Status: SHIPPED | OUTPATIENT
Start: 2024-07-30

## 2024-07-30 ASSESSMENT — ENCOUNTER SYMPTOMS
DYSPHORIC MOOD: 0
DECREASED CONCENTRATION: 0
FATIGUE: 0
CARDIOVASCULAR NEGATIVE: 1
RESPIRATORY NEGATIVE: 1
SLEEP DISTURBANCE: 0
FEVER: 0
ENDOCRINE COMMENTS: SWOLLEN NECK
SINUS PRESSURE: 0
SORE THROAT: 0
TROUBLE SWALLOWING: 0
GASTROINTESTINAL NEGATIVE: 1
NERVOUS/ANXIOUS: 0
SINUS PAIN: 0

## 2024-07-31 ENCOUNTER — HOSPITAL ENCOUNTER (OUTPATIENT)
Dept: RADIOLOGY | Facility: HOSPITAL | Age: 29
Discharge: HOME | End: 2024-07-31
Payer: MEDICARE

## 2024-07-31 DIAGNOSIS — E55.9 VITAMIN D DEFICIENCY: Primary | ICD-10-CM

## 2024-07-31 DIAGNOSIS — R22.1 LOCALIZED SWELLING, MASS AND LUMP, NECK: ICD-10-CM

## 2024-07-31 PROCEDURE — 76536 US EXAM OF HEAD AND NECK: CPT | Performed by: RADIOLOGY

## 2024-07-31 PROCEDURE — 76536 US EXAM OF HEAD AND NECK: CPT

## 2024-07-31 RX ORDER — ACETAMINOPHEN 500 MG
5000 TABLET ORAL DAILY
Qty: 90 TABLET | Refills: 1 | Status: SHIPPED | OUTPATIENT
Start: 2024-07-31

## 2024-08-01 DIAGNOSIS — R22.1 LOCALIZED SWELLING, MASS AND LUMP, NECK: Primary | ICD-10-CM

## 2024-08-01 DIAGNOSIS — R93.89 ABNORMAL FINDING ON IMAGING: ICD-10-CM

## 2024-08-05 ENCOUNTER — HOSPITAL ENCOUNTER (OUTPATIENT)
Dept: RADIOLOGY | Facility: HOSPITAL | Age: 29
Discharge: HOME | End: 2024-08-05
Payer: MEDICARE

## 2024-08-05 DIAGNOSIS — R93.89 ABNORMAL FINDING ON IMAGING: ICD-10-CM

## 2024-08-05 DIAGNOSIS — R22.1 LOCALIZED SWELLING, MASS AND LUMP, NECK: ICD-10-CM

## 2024-08-05 PROCEDURE — 70491 CT SOFT TISSUE NECK W/DYE: CPT

## 2024-08-05 PROCEDURE — 70491 CT SOFT TISSUE NECK W/DYE: CPT | Performed by: RADIOLOGY

## 2024-08-05 PROCEDURE — 2550000001 HC RX 255 CONTRASTS: Performed by: NURSE PRACTITIONER

## 2024-08-07 DIAGNOSIS — R59.0 CERVICAL ADENOPATHY: ICD-10-CM

## 2024-08-07 DIAGNOSIS — R22.1 LOCALIZED SWELLING, MASS AND LUMP, NECK: Primary | ICD-10-CM

## 2024-12-27 ENCOUNTER — OFFICE VISIT (OUTPATIENT)
Dept: PRIMARY CARE | Facility: CLINIC | Age: 29
End: 2024-12-27
Payer: MEDICARE

## 2024-12-27 VITALS
HEIGHT: 64 IN | SYSTOLIC BLOOD PRESSURE: 138 MMHG | DIASTOLIC BLOOD PRESSURE: 80 MMHG | HEART RATE: 90 BPM | BODY MASS INDEX: 50.02 KG/M2 | WEIGHT: 293 LBS

## 2024-12-27 DIAGNOSIS — R00.2 PALPITATIONS: Primary | ICD-10-CM

## 2024-12-27 DIAGNOSIS — E55.9 VITAMIN D DEFICIENCY: ICD-10-CM

## 2024-12-27 DIAGNOSIS — E66.01 CLASS 3 SEVERE OBESITY DUE TO EXCESS CALORIES WITH SERIOUS COMORBIDITY AND BODY MASS INDEX (BMI) OF 50.0 TO 59.9 IN ADULT: ICD-10-CM

## 2024-12-27 DIAGNOSIS — E66.813 CLASS 3 SEVERE OBESITY DUE TO EXCESS CALORIES WITH SERIOUS COMORBIDITY AND BODY MASS INDEX (BMI) OF 50.0 TO 59.9 IN ADULT: ICD-10-CM

## 2024-12-27 DIAGNOSIS — R06.02 MILD SHORTNESS OF BREATH: ICD-10-CM

## 2024-12-27 DIAGNOSIS — I45.9 SKIPPED HEART BEATS: ICD-10-CM

## 2024-12-27 PROCEDURE — G2211 COMPLEX E/M VISIT ADD ON: HCPCS | Performed by: NURSE PRACTITIONER

## 2024-12-27 PROCEDURE — 3008F BODY MASS INDEX DOCD: CPT | Performed by: NURSE PRACTITIONER

## 2024-12-27 PROCEDURE — 99213 OFFICE O/P EST LOW 20 MIN: CPT | Performed by: NURSE PRACTITIONER

## 2024-12-27 PROCEDURE — 1036F TOBACCO NON-USER: CPT | Performed by: NURSE PRACTITIONER

## 2024-12-27 ASSESSMENT — ENCOUNTER SYMPTOMS
PSYCHIATRIC NEGATIVE: 1
CHEST TIGHTNESS: 1
PALPITATIONS: 1
SHORTNESS OF BREATH: 1
MUSCULOSKELETAL NEGATIVE: 1
FATIGUE: 0
GASTROINTESTINAL NEGATIVE: 1
HEMATOLOGIC/LYMPHATIC NEGATIVE: 1
LIGHT-HEADEDNESS: 0
DIZZINESS: 0

## 2024-12-27 NOTE — PROGRESS NOTES
"Subjective   Patient ID: Geri Moss is a 29 y.o. female who presents for Follow-up.    HPI   Geri returns for concerns of heart palpitations getting worse. Feels like her heart is palpitating and skipping a beat. She is getting shortness of breath with these episodes. For the past week, it has been happening everyday. Today it has happened three times so far.   She does not institute these with any activities and does not drink caffeine everyday, but will have occasionally.     ECHO in 2023-shows PFO.      Review of Systems   Constitutional:  Negative for fatigue.   HENT: Negative.     Respiratory:  Positive for chest tightness (does feel it sometimes with palpitations) and shortness of breath.    Cardiovascular:  Positive for palpitations and leg swelling. Negative for chest pain.   Gastrointestinal: Negative.    Genitourinary: Negative.    Musculoskeletal: Negative.    Neurological:  Negative for dizziness and light-headedness.   Hematological: Negative.    Psychiatric/Behavioral: Negative.         Objective   /80   Pulse 90   Ht 1.626 m (5' 4\")   Wt 142 kg (312 lb 4 oz)   BMI 53.60 kg/m²     Physical Exam  Vitals and nursing note reviewed.   Constitutional:       Appearance: Normal appearance.   HENT:      Head: Normocephalic and atraumatic.   Cardiovascular:      Rate and Rhythm: Normal rate.      Pulses: Normal pulses.      Heart sounds: Normal heart sounds.   Pulmonary:      Effort: Pulmonary effort is normal.      Breath sounds: Normal breath sounds.   Skin:     General: Skin is warm and dry.   Neurological:      General: No focal deficit present.      Mental Status: She is alert and oriented to person, place, and time.   Psychiatric:         Mood and Affect: Mood normal.         Behavior: Behavior normal.         Thought Content: Thought content normal.         Judgment: Judgment normal.         Assessment/Plan   Problem List Items Addressed This Visit             ICD-10-CM    Vitamin " D deficiency E55.9    Relevant Orders    Vitamin D 25-Hydroxy,Total (for eval of Vitamin D levels)    Palpitations - Primary R00.2    Relevant Orders    Holter Or Event Cardiac Monitor    Vitamin B12    Vitamin D 25-Hydroxy,Total (for eval of Vitamin D levels)    TSH with reflex to Free T4 if abnormal    Magnesium    Basic metabolic panel    Mild shortness of breath R06.02    Relevant Orders    Holter Or Event Cardiac Monitor    Class 3 severe obesity due to excess calories with serious comorbidity and body mass index (BMI) of 50.0 to 59.9 in adult E66.813, Z68.43, E66.01     Other Visit Diagnoses         Codes    Skipped heart beats     I45.9    Relevant Orders    Holter Or Event Cardiac Monitor              Follow up after holter monitor.     For any new medications that were prescribed today, the patient was educated about their indications for use, administration, frequency and potential side effects of the medication.

## 2024-12-31 ENCOUNTER — HOSPITAL ENCOUNTER (OUTPATIENT)
Dept: CARDIOLOGY | Facility: HOSPITAL | Age: 29
Discharge: HOME | End: 2024-12-31
Payer: MEDICARE

## 2024-12-31 DIAGNOSIS — R06.02 MILD SHORTNESS OF BREATH: ICD-10-CM

## 2024-12-31 DIAGNOSIS — R00.2 PALPITATIONS: ICD-10-CM

## 2024-12-31 DIAGNOSIS — I45.9 SKIPPED HEART BEATS: ICD-10-CM

## 2024-12-31 PROCEDURE — 9420000001 HC RT PATIENT EDUCATION 5 MIN

## 2024-12-31 PROCEDURE — 93270 REMOTE 30 DAY ECG REV/REPORT: CPT

## 2025-01-13 DIAGNOSIS — L23.9 ALLERGIC DERMATITIS: Primary | ICD-10-CM

## 2025-01-13 RX ORDER — HYDROCORTISONE 25 MG/G
CREAM TOPICAL 2 TIMES DAILY PRN
Qty: 30 G | Refills: 0 | Status: SHIPPED | OUTPATIENT
Start: 2025-01-13

## 2025-01-13 RX ORDER — LORATADINE 10 MG/1
10 TABLET ORAL DAILY
Qty: 30 TABLET | Refills: 0 | Status: SHIPPED | OUTPATIENT
Start: 2025-01-13

## 2025-01-13 RX ORDER — FAMOTIDINE 20 MG/1
20 TABLET, FILM COATED ORAL 2 TIMES DAILY
Qty: 60 TABLET | Refills: 0 | Status: SHIPPED | OUTPATIENT
Start: 2025-01-13

## 2025-01-28 ENCOUNTER — APPOINTMENT (OUTPATIENT)
Age: 30
End: 2025-01-28
Payer: MEDICARE

## 2025-03-17 DIAGNOSIS — R00.2 PALPITATIONS: ICD-10-CM

## 2025-03-17 DIAGNOSIS — I45.9 SKIPPED HEART BEATS: Primary | ICD-10-CM

## 2025-03-19 ENCOUNTER — APPOINTMENT (OUTPATIENT)
Dept: CARDIOLOGY | Facility: CLINIC | Age: 30
End: 2025-03-19
Payer: MEDICARE

## 2025-03-21 ENCOUNTER — OFFICE VISIT (OUTPATIENT)
Dept: CARDIOLOGY | Facility: CLINIC | Age: 30
End: 2025-03-21
Payer: MEDICARE

## 2025-03-21 VITALS
DIASTOLIC BLOOD PRESSURE: 72 MMHG | BODY MASS INDEX: 50.02 KG/M2 | HEIGHT: 64 IN | SYSTOLIC BLOOD PRESSURE: 116 MMHG | HEART RATE: 99 BPM | OXYGEN SATURATION: 98 % | WEIGHT: 293 LBS

## 2025-03-21 DIAGNOSIS — I49.3 PVC (PREMATURE VENTRICULAR CONTRACTION): ICD-10-CM

## 2025-03-21 DIAGNOSIS — I45.9 SKIPPED HEART BEATS: ICD-10-CM

## 2025-03-21 DIAGNOSIS — R00.2 PALPITATIONS: Primary | ICD-10-CM

## 2025-03-21 PROCEDURE — 3008F BODY MASS INDEX DOCD: CPT

## 2025-03-21 PROCEDURE — 1036F TOBACCO NON-USER: CPT

## 2025-03-21 PROCEDURE — 93000 ELECTROCARDIOGRAM COMPLETE: CPT

## 2025-03-21 PROCEDURE — 99204 OFFICE O/P NEW MOD 45 MIN: CPT

## 2025-03-21 NOTE — Clinical Note
Thanks for referral!  She is experiencing benign ectopy.  No need for any treatment.  Thanks for the workup in advance!

## 2025-03-21 NOTE — PROGRESS NOTES
[facility]  Cardiology Clinic Visit Note    History of present illness:  This is a 30 y.o. female with a history of prediabetes, anxiety, depression, migraines, epilepsy, and PFO who presents to the clinic for palpitations.    She had an office visit with her PCP Shabnam Sarkar on 12/27/2024  with concerns of worsening heart palpitations, feels like a skipped beat associated with shortness of breath.  She proceeded with a 30-day MCT.  Patient requested referral to cardiology.    PMHx/PSHx: As above  Tobacco Denies, Alcohol Denies, Caffeine use   1-2 can of pop / day, Drug use  Denies  FamHx: Maternal grandfather passed of heart attack in his 70s.     Subjective:  She feels skipped beat and follow-up in chest for few seconds ranging in frequency from daily to weekly for the past year or so.  Gets short of breath climbing a set of stairs.  She denies chest pain, leg edema, fever, chills, orthopnea, paroxysmal nocturnal dyspnea or syncope.   Recently began the exercise regularly with a treadmill and going to the gym.    Cardiac Testing  Echo (February 2023): Estimated LVEF 50%, positive bubble study right-to-left shunt suggesting PFO.  No hemodynamically significant valvular disease.  30-day MCT (December 2024 - January 2025): 25-day study with only less than 13 days of diagnostic data. Predominant rhythm sinus with an average heart rate of 82 bpm and a range of 155 to 54 bpm, PVC and PAC burden less than 1%.  There were 28 patient triggered events mostly correlating with sinus rhythm and occasionally sinus with PVC.  No significant conduction abnormality noted.    Assessment and Plan  Palpitations  PVC  -ECG in clinic today shows sinus rhythm with no ectopy.  -Less than 1% PVC burden on monitor from January 2025.  -Her symptoms are consistent with benign ectopic beats, no need for treatment or further cardiac testing.  -Shortness of breath walking up stairs likely related to her obesity. She improve  with physical exercise and losing weight.  -We discussed the avoidance of triggers such as excessive caffeine intake and emotional stress.  -Encouraged her to reach out to us again if her symptoms worsen palpitation since last for extended period time, hours.  Otherwise we can see her on as-needed basis.    Patent foramen ovale  -No signs or symptoms of excessive right-to-left shunting  -We discussed how this is very common, 1 out of 4 people have a PFO and generally does not require closure except in the setting of cryptogenic stroke or excessive right-to-left atrial shunting.    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       Past Surgical History  Past Surgical History:   Procedure Laterality Date    ANTERIOR CRUCIATE LIGAMENT REPAIR Right        Medications  Current Outpatient Medications   Medication Instructions    busPIRone (BUSPAR) 5 mg, oral, 3 times daily PRN    cholecalciferol (VITAMIN D3) 5,000 Units, oral, Daily    escitalopram (LEXAPRO) 20 mg, oral, Daily    famotidine (PEPCID) 20 mg, oral, 2 times daily    hydrocortisone 2.5 % cream Topical, 2 times daily PRN    lacosamide (Vimpat) 100 mg tablet 1 tablet, 2 times daily    levETIRAcetam (Keppra) 1,000 mg tablet TAKE 1 AND 1/2 TABLETS BY MOUTH 2 TIMES A DAY    levETIRAcetam (KEPPRA) 1,500 mg, 2 times daily    loratadine (CLARITIN) 10 mg, oral, Daily       Allergies  No Known Allergies    Social History  Social History     Tobacco Use    Smoking status: Never    Smokeless tobacco: Never   Vaping Use    Vaping status: Never Used   Substance Use Topics    Alcohol use: Never     Drug use: Never       Family History  Family History   Problem Relation Name Age of Onset    Other (HTN) Mother      Diabetes type II Mother          WITH DIABETIC MACULAR EDEMA OF RIGHT EYE RECOLVED AFTER TREATMENTS           PHYSICAL EXAM  Physical Exam  Vitals and nursing note reviewed.   Constitutional:       General: She is not in acute distress.     Appearance: She is obese.   HENT:      Head: Normocephalic and atraumatic.      Mouth/Throat:      Mouth: Mucous membranes are moist.      Pharynx: Oropharynx is clear.   Eyes:      General: No scleral icterus.     Pupils: Pupils are equal, round, and reactive to light.   Cardiovascular:      Rate and Rhythm: Normal rate and regular rhythm.      Pulses: Normal pulses.      Heart sounds: Normal heart sounds, S1 normal and S2 normal. No murmur heard.     No friction rub.   Pulmonary:      Effort: Pulmonary effort is normal.      Breath sounds: Normal breath sounds.   Abdominal:      General: Bowel sounds are normal. There is no distension.      Palpations: Abdomen is soft.      Tenderness: There is no abdominal tenderness.   Musculoskeletal:         General: Normal range of motion.      Cervical back: Normal range of motion and neck supple.      Right lower leg: No edema.      Left lower leg: No edema.   Skin:     General: Skin is warm and dry.      Capillary Refill: Capillary refill takes less than 2 seconds.      Findings: No rash.   Neurological:      General: No focal deficit present.      Mental Status: She is alert.   Psychiatric:         Mood and Affect: Mood normal.         Behavior: Behavior normal.         Cardiovascular Labs  Lab Results   Component Value Date    HGB 12.6 07/30/2024    HGB 12.3 01/30/2024    HGB 12.1 03/28/2023    HGB 12.5 02/28/2023    HGB 12.5 01/31/2023     07/30/2024    WBC 12.6 (H) 07/30/2024     07/30/2024    K 4.4 07/30/2024    CREATININE 0.69 07/30/2024    CREATININE 0.60 01/30/2024    CREATININE 0.65 03/28/2023     CREATININE 0.62 01/31/2023    BUN 13 07/30/2024    CALCIUM 9.0 07/30/2024    INR 1.1 03/28/2023    LDLF 82 01/31/2023       Echocardiogram  Results for orders placed in visit on 02/07/23    Echocardiogram    Narrative  [address] [address]  Phone [phone], Fax [phone]    TRANSTHORACIC ECHOCARDIOGRAM REPORT      Patient Name:     EZEKIEL TY Hayward Physician:  [id number] Yoni HOOKS MD  Study Date:       2/7/2023          Referring           TONIA ABRAHAM  Physician:  MRN/PID:          [id number]          PCP:  Accession/Order#: [id number]      Department          [facility] Echo Lab  Location:  YOB: 1995         Fellow:  Gender:           F                 Nurse:              Zuleyka Llanos RN  Admit Date:                         Sonographer:        Jose Calderon [facility]  Admission Status: Outpatient        Additional Staff:  Height:           162.56 cm         CC Report to:  Weight:           141.98 kg         Study Type:         Echocardiogram  BSA:              2.37 m2  Blood Pressure: 115 /64 mmHg    Diagnosis/ICD: R06.02-Shortness of breath  Indication:  Procedure/CPT: Echo Complete w Full [id number]  Study Detail: The following Echo studies were performed: 2D, M-Mode, Doppler and  color flow. Definity used as a contrast agent for endocardial  border definition and agitated saline used as a contrast agent for  intraseptal flow evaluation. Total contrast used for this  procedure was 2.5cc mL via IV push.      PHYSICIAN INTERPRETATION:  Left Ventricle: Left ventricular systolic function is low normal, with an estimated ejection fraction of 50%. There are no regional wall motion abnormalities. The left ventricular cavity size is normal. Spectral Doppler shows a normal pattern of left ventricular diastolic filling.  Left Atrium: The left atrium is normal in size.  Right Ventricle: The right ventricle is  normal in size. There is normal right ventricular global systolic function.  Right Atrium: The right atrium is normal in size.  Aortic Valve: The aortic valve is probably trileaflet. There is no evidence of aortic valve regurgitation. The peak instantaneous gradient of the aortic valve is 10.1 mmHg. The mean gradient of the aortic valve is 5.0 mmHg.  Mitral Valve: The mitral valve is normal in structure. There is no evidence of mitral valve regurgitation.  Tricuspid Valve: The tricuspid valve was not well visualized. No evidence of tricuspid regurgitation.  Pulmonic Valve: The pulmonic valve is not well visualized. There is no indication of pulmonic valve regurgitation.  Pericardium: There is no pericardial effusion noted.  Aorta: The aortic root was not well visualized.      CONCLUSIONS:  1. Left ventricular systolic function is low normal with a 50% estimated ejection fraction.  2. Bubble study is positive for right-to-left shunt, suggestive of a patent foramen ovale (PFO).    QUANTITATIVE DATA SUMMARY:  2D MEASUREMENTS:  Normal Ranges:  Ao Root d:     2.80 cm   (2.0-3.7cm)  LAs:           3.20 cm   (2.7-4.0cm)  IVSd:          1.15 cm   (0.6-1.1cm)  LVPWd:         0.98 cm   (0.6-1.1cm)  LVIDd:         4.43 cm   (3.9-5.9cm)  LVIDs:         3.42 cm  LV Mass Index: 68.8 g/m2  LV % FS        22.8 %    LA VOLUME:  Normal Ranges:  LA Vol A4C:        31.3 ml    (22+/-6mL/m2)  LA Vol A2C:        43.6 ml  LA Vol BP:         38.5 ml  LA Vol Index A4C:  13.2ml/m2  LA Vol Index A2C:  18.4 ml/m2  LA Vol Index BP:   16.3 ml/m2  LA Area A4C:       13.5 cm2  LA Area A2C:       16.6 cm2  LA Major Axis A4C: 5.0 cm  LA Major Axis A2C: 5.4 cm  LA Volume Index:   13.2 ml/m2  LA Vol A4C:        31.4 ml  LA Vol A2C:        43.0 ml    LV SYSTOLIC FUNCTION BY 2D PLANIMETRY (MOD):  Normal Ranges:  EF-A4C View: 43.1 % (>=55%)  EF-A2C View: 42.1 %  EF-Biplane:  41.7 %    LV DIASTOLIC FUNCTION:  Normal Ranges:  MV Peak E:    0.76 m/s (0.7-1.2  m/s)  MV Peak A:    1.07 m/s (0.42-0.7 m/s)  E/A Ratio:    0.71     (1.0-2.2)  MV lateral e' 0.14 m/s  MV medial e'  0.09 m/s    MITRAL VALVE:  Normal Ranges:  MV DT: 67 msec (150-240msec)    AORTIC VALVE:  Normal Ranges:  AoV Vmax:                1.59 m/s  (<=1.7m/s)  AoV Peak PG:             10.1 mmHg (<20mmHg)  AoV Mean P.0 mmHg  (1.7-11.5mmHg)  LVOT Max Tomasz:            1.23 m/s  (<=1.1m/s)  AoV VTI:                 31.10 cm  (18-25cm)  LVOT VTI:                23.10 cm  LVOT Diameter:           1.80 cm   (1.8-2.4cm)  AoV Area, VTI:           1.89 cm2  (2.5-5.5cm2)  AoV Area,Vmax:           1.97 cm2  (2.5-4.5cm2)  AoV Dimensionless Index: 0.74    RIGHT VENTRICLE:  RV 1  4.45 cm  RV 2  2.94 cm  RV 3  7.67 cm  RV s' 0.15 m/s    PULMONIC VALVE:  Normal Ranges:  PV Accel Time: 116 msec (>120ms)  PV Max Tomasz:    1.1 m/s  (0.6-0.9m/s)  PV Max P.5 mmHg      [id number] Yoni Stephens MD  Electronically signed on 2023 at 10:19:40 AM         Final        The ASCVD Risk score (Gianni DK, et al., 2019) failed to calculate for the following reasons:    The 2019 ASCVD risk score is only valid for ages 40 to 79  Low Risk: <5%  Borderline Risk: 5%-7.4%  Intermediate Risk: 7.5% - 19.9%  High Risk: >20%    If your symptoms worsen or progress please go directory to your nearest emergency department for evaluation.     Thank you for this interesting clinical case and allowing me to participate in the care of this patient. Please reach me out if you have any questions or if you need any clarifications regarding this patient's care.    **Disclaimer: This note was dictated by speech recognition, and every effort has been made to prevent any error in transcription, however minor errors may be present**  ___________________________________________________  Dez Hinojosa, MSN, CNP, ACNPC, CCRN  Advanced Practice Provider, Nurse Practitioner  Division of Cardiovascular Medicine  [facility]  [facility]